# Patient Record
Sex: MALE | Race: BLACK OR AFRICAN AMERICAN | NOT HISPANIC OR LATINO | Employment: FULL TIME | URBAN - METROPOLITAN AREA
[De-identification: names, ages, dates, MRNs, and addresses within clinical notes are randomized per-mention and may not be internally consistent; named-entity substitution may affect disease eponyms.]

---

## 2017-05-30 ENCOUNTER — OFFICE VISIT (OUTPATIENT)
Dept: INTERNAL MEDICINE | Facility: CLINIC | Age: 47
End: 2017-05-30
Payer: COMMERCIAL

## 2017-05-30 VITALS
SYSTOLIC BLOOD PRESSURE: 142 MMHG | WEIGHT: 225.5 LBS | HEIGHT: 76 IN | BODY MASS INDEX: 27.46 KG/M2 | TEMPERATURE: 98 F | HEART RATE: 73 BPM | OXYGEN SATURATION: 98 % | DIASTOLIC BLOOD PRESSURE: 92 MMHG

## 2017-05-30 DIAGNOSIS — T63.441A BEE STING, ACCIDENTAL OR UNINTENTIONAL, INITIAL ENCOUNTER: Primary | ICD-10-CM

## 2017-05-30 DIAGNOSIS — R03.0 ELEVATED BLOOD PRESSURE READING: ICD-10-CM

## 2017-05-30 PROCEDURE — 99214 OFFICE O/P EST MOD 30 MIN: CPT | Mod: PBBFAC | Performed by: INTERNAL MEDICINE

## 2017-05-30 PROCEDURE — 99213 OFFICE O/P EST LOW 20 MIN: CPT | Mod: S$GLB,,, | Performed by: INTERNAL MEDICINE

## 2017-05-30 PROCEDURE — 99999 PR PBB SHADOW E&M-EST. PATIENT-LVL IV: CPT | Mod: PBBFAC,,, | Performed by: INTERNAL MEDICINE

## 2017-05-30 RX ORDER — PREDNISONE 20 MG/1
40 TABLET ORAL DAILY
Qty: 10 TABLET | Refills: 0 | Status: SHIPPED | OUTPATIENT
Start: 2017-05-30 | End: 2017-06-04

## 2017-05-30 NOTE — PROGRESS NOTES
Subjective:       Patient ID: Goldy Irizarry is a 46 y.o. male.    Chief Complaint: Insect Bite    HPI    3 weeks ago when helping a friend with house projects he sustained a bee sting to the dorsum of the left hand.  There was some mild swelling and soreness, however symptoms resolved over a week.  Yesterday he sustained another staining on the forearm, witnessed the bee sting in, occurred around 3 PM.  He began develop swelling which woke him up at night in the arm, including around the left hand where he was previously stung.  No nausea or vomiting, no fever or chills, no diarrhea.  He was having some difficulty fully closing his hand this afternoon, but that has started to improve.    Review of Systems   Constitutional: Negative for activity change and unexpected weight change.   HENT: Negative for hearing loss, rhinorrhea and trouble swallowing.    Eyes: Negative for discharge and visual disturbance.   Respiratory: Negative for chest tightness and wheezing.    Cardiovascular: Negative for chest pain and palpitations.   Gastrointestinal: Negative for blood in stool, constipation, diarrhea and vomiting.   Endocrine: Negative for polydipsia and polyuria.   Genitourinary: Negative for difficulty urinating, hematuria and urgency.   Musculoskeletal: Positive for joint swelling. Negative for arthralgias and neck pain.   Neurological: Negative for weakness and headaches.   Psychiatric/Behavioral: Negative for confusion and dysphoric mood.       Objective:      Physical Exam   Constitutional: No distress.   -American man whose Body mass index is 27.45 kg/m².    Musculoskeletal: He exhibits edema (in dorsum of L hand, some also dorsum of L forearm). He exhibits no tenderness.   slightly diminished range of motion in L hand fingers secondary to swelling   Nursing note and vitals reviewed.      Vitals:    05/30/17 1808 05/30/17 1813 05/30/17 1900   BP: (!) 140/101 (!) 162/92 (!) 142/92   BP Location:   Right arm  "  Patient Position:   Sitting   BP Method:   Manual   Pulse: 68 73    Temp: 97.9 °F (36.6 °C)     TempSrc: Oral     SpO2: 98%     Weight: 102.3 kg (225 lb 8.5 oz)     Height: 6' 4" (1.93 m)       Body mass index is 27.45 kg/m².    I have personally checked the blood pressure and documented my findings.     Assessment:       1. Bee sting, accidental or unintentional, initial encounter    2. Elevated blood pressure reading        Plan:   Goldy was seen today for insect bite.    Diagnoses and all orders for this visit:    Bee sting, accidental or unintentional, initial encounter:  No signs of anaphylaxis. Tx as below:  "If the swelling in the arm starts to worsen, or if new swelling starts to develop other places, please fill the printed prescription for prednisone.  For relief of swelling, please start daily over-the-counter antihistamines like Claritin, Zyrtec, or Allegra once daily; the generic versions all work well, too.  Please also use over-the-counter Benadryl at night."  -     predniSONE (DELTASONE) 20 MG tablet; Take 2 tablets (40 mg total) by mouth once daily.    Elevated blood pressure reading:  Counseled to avoid salty foods. Monitor at home. If persistently elevated please notify the office.    Keep regular follow up appointments with Yi Rojas MD.   Jonh Cote MD  Internal Medicine    Portions of this note were completed using [employer] dictation software. Please excuse typographical or syntax errors.   "

## 2017-05-31 NOTE — PATIENT INSTRUCTIONS
High salt foods will affect blood pressure. Processed foods (including pastas and breads) often contain a high amount of salt, canned foods as well.    Blood Pressure Measurement:    -- Please record your blood pressure 1-2 times per week. When checking, make sure you have been sitting for about 5 minutes, your legs are uncrossed, and the blood pressure cuff at the level of your heart. Record your blood pressure with the date and time in a log and bring it with you to every doctor's visit.  -- Goal blood pressure is top number less than 140 and bottom number less than 90. If your blood pressure is higher than this on two consecutive occasions, contact the office.    If the swelling in the arm starts to worsen, or if new swelling starts to develop other places, please fill the printed prescription for prednisone.    For relief of swelling, please start daily over-the-counter antihistamines like Claritin, Zyrtec, or Allegra once daily; the generic versions all work well, too.  Please also use over-the-counter Benadryl at night.

## 2017-11-02 ENCOUNTER — TELEPHONE (OUTPATIENT)
Dept: DERMATOLOGY | Facility: CLINIC | Age: 47
End: 2017-11-02

## 2017-11-02 NOTE — TELEPHONE ENCOUNTER
----- Message from Rachel Rivero sent at 11/1/2017  4:55 PM CDT -----  Contact: self  Pt called and scheduled an appt on 11/08/17. However, pt would like to know if you have any afternoon cancellations for this week, Mon(11/6) or Tues(11/7).     Pt can be reached at [phone].    Thank you

## 2017-11-02 NOTE — TELEPHONE ENCOUNTER
Spoke with pt regarding appointment. Pt informed that there are no later appointments available for the days he asked for. Appt remains for 11/8/17 @ 3 p.m..         ----- Message from Tatyana Skelton sent at 11/2/2017 11:32 AM CDT -----  Contact: pt   Jose-pt- pt is returning Santa's call can you please call pt at [phone]    SUSHIL

## 2017-11-08 ENCOUNTER — OFFICE VISIT (OUTPATIENT)
Dept: DERMATOLOGY | Facility: CLINIC | Age: 47
End: 2017-11-08
Payer: COMMERCIAL

## 2017-11-08 DIAGNOSIS — L91.8 SKIN TAG: Primary | ICD-10-CM

## 2017-11-08 DIAGNOSIS — R20.9 SKIN SENSATION DISTURBANCE: ICD-10-CM

## 2017-11-08 PROCEDURE — 99499 UNLISTED E&M SERVICE: CPT | Mod: S$GLB,,, | Performed by: NURSE PRACTITIONER

## 2017-11-08 PROCEDURE — 11200 RMVL SKIN TAGS UP TO&INC 15: CPT | Mod: S$GLB,,, | Performed by: NURSE PRACTITIONER

## 2017-11-08 PROCEDURE — 99999 PR PBB SHADOW E&M-EST. PATIENT-LVL II: CPT | Mod: PBBFAC,,, | Performed by: NURSE PRACTITIONER

## 2017-11-08 NOTE — PATIENT INSTRUCTIONS

## 2017-11-08 NOTE — PROGRESS NOTES
Subjective:       Patient ID:  Goldy Irizarry is a 47 y.o. male who presents for   Chief Complaint   Patient presents with    Skin Tags     right groin x 2 years, irritated raise no prev tx, x 3 weeks, swelled up a little part of it came off/discharge      Skin Tags  - Initial  Affected locations: groin (right)  Duration: 2 years (Irritated with in the past 2-3 weeks)  Signs / symptoms: irritated  Severity: mild  Timing: constant  Aggravated by: friction  Relieving factors/Treatments tried: nothing  Improvement on treatment: no relief        Review of Systems   Constitutional: Negative for fever and chills.   Skin: Positive for activity-related sunscreen use. Negative for daily sunscreen use.   Hematologic/Lymphatic: Does not bruise/bleed easily.        Objective:    Physical Exam   Constitutional: He appears well-developed and well-nourished. No distress.   Neurological: He is alert and oriented to person, place, and time. He is not disoriented.   Psychiatric: He has a normal mood and affect.   Skin:   Areas Examined (abnormalities noted in diagram):   Neck Inspection Performed  Genitals / Buttocks / Groin Inspection Performed              Diagram Legend     Erythematous scaling macule/papule c/w actinic keratosis       Vascular papule c/w angioma      Pigmented verrucoid papule/plaque c/w seborrheic keratosis      Yellow umbilicated papule c/w sebaceous hyperplasia      Irregularly shaped tan macule c/w lentigo     1-2 mm smooth white papules consistent with Milia      Movable subcutaneous cyst with punctum c/w epidermal inclusion cyst      Subcutaneous movable cyst c/w pilar cyst      Firm pink to brown papule c/w dermatofibroma      Pedunculated fleshy papule(s) c/w skin tag(s)      Evenly pigmented macule c/w junctional nevus     Mildly variegated pigmented, slightly irregular-bordered macule c/w mildly atypical nevus      Flesh colored to evenly pigmented papule c/w intradermal nevus       Pink pearly  papule/plaque c/w basal cell carcinoma      Erythematous hyperkeratotic cursted plaque c/w SCC      Surgical scar with no sign of skin cancer recurrence      Open and closed comedones      Inflammatory papules and pustules      Verrucoid papule consistent consistent with wart     Erythematous eczematous patches and plaques     Dystrophic onycholytic nail with subungual debris c/w onychomycosis     Umbilicated papule    Erythematous-base heme-crusted tan verrucoid plaque consistent with inflamed seborrheic keratosis     Erythematous Silvery Scaling Plaque c/w Psoriasis     See annotation      Assessment / Plan:        Skin tag- inflamed, with  Skin sensation disturbance  Verbal consent obtained. 1 lesions- right groin removed with scissor snip removal after anesthesia with 1% lidocaine with epinephrine. Hemostasis achieved with aluminum chloride and hyfrecation. No complications.             Return if symptoms worsen or fail to improve.

## 2017-12-07 ENCOUNTER — TELEPHONE (OUTPATIENT)
Dept: NEUROSURGERY | Facility: CLINIC | Age: 47
End: 2017-12-07

## 2017-12-07 ENCOUNTER — TELEPHONE (OUTPATIENT)
Dept: ORTHOPEDICS | Facility: CLINIC | Age: 47
End: 2017-12-07

## 2017-12-07 DIAGNOSIS — M54.2 ACUTE NECK PAIN: Primary | ICD-10-CM

## 2017-12-07 DIAGNOSIS — M25.512 LEFT SHOULDER PAIN, UNSPECIFIED CHRONICITY: Primary | ICD-10-CM

## 2017-12-07 NOTE — TELEPHONE ENCOUNTER
Ortho Telephone Triage Message  [phone]  Patient C/O: L shoulder/arm pain and numbness. Pt indicates that pain started in his neck about a month ago. Pt denies known injury. Pt requests same day Ortho appt and declines being seen in Urgent Care/ED.   Triage Advise: Advised pt that - if he does not want to be seen by PCP/ Urgent Care/ED - he may be seen, if appt available, only for L shoulder or neck complaint today. One or other will need to be scheduled as a separate appt at a later date.   Resolution: Pt states understanding and would like appt scheduled for either complaint, if available today. Advised pt that he will receive a follow up call with appt today for c/o  L shoulder or neck pain. Pt states understanding and will await call. ROBERT Mclaughlin-CHARISSA/[facility] notified and appt to be scheduled this afternoon for c/o L shoulder pain.

## 2017-12-07 NOTE — TELEPHONE ENCOUNTER
Ortho Telephone Triage Follow Up Call [phone]  Spoke with pt who declines appt scheduled today with COLLEEN Garcia PA-C/[facility] for c/o L shoulder/arm pain/numbness. Appt cancelled.  Notified pt that first available appt for c/o neck pain has been scheduled with ROBERT Robb/[facility] on 12/13/17 at 9:00am with xrays to be scheduled prior to appt. Advised pt that ESTEFANIA Puckett MA/[facility] will be contacting him to schedule xrays. Pt states understanding. Pt to go to Urgent Care or ED for worsening pain or other concerns, in interim. [facility] contact number provided.

## 2017-12-07 NOTE — TELEPHONE ENCOUNTER
----- Message from Pat Foss sent at 12/7/2017 10:18 AM CST -----  Contact: Self  Pt is calling to be seen for neck/back/arm/shoulder pain today, hopefully.  Pt says he is in extreme pain and has been the last month.  He is requesting a returned call.    He can be reached at [phone].    Thank you.

## 2021-06-14 ENCOUNTER — IMMUNIZATION (OUTPATIENT)
Dept: INTERNAL MEDICINE | Facility: CLINIC | Age: 51
End: 2021-06-14
Payer: COMMERCIAL

## 2021-06-14 DIAGNOSIS — Z23 NEED FOR VACCINATION: Primary | ICD-10-CM

## 2021-06-14 PROCEDURE — 91300 COVID-19, MRNA, LNP-S, PF, 30 MCG/0.3 ML DOSE VACCINE: CPT | Mod: PBBFAC | Performed by: INTERNAL MEDICINE

## 2021-07-08 ENCOUNTER — IMMUNIZATION (OUTPATIENT)
Dept: INTERNAL MEDICINE | Facility: CLINIC | Age: 51
End: 2021-07-08
Payer: COMMERCIAL

## 2021-07-08 DIAGNOSIS — Z23 NEED FOR VACCINATION: Primary | ICD-10-CM

## 2021-07-08 PROCEDURE — 0002A COVID-19, MRNA, LNP-S, PF, 30 MCG/0.3 ML DOSE VACCINE: ICD-10-PCS | Mod: CV19,S$GLB,, | Performed by: INTERNAL MEDICINE

## 2021-07-08 PROCEDURE — 0002A COVID-19, MRNA, LNP-S, PF, 30 MCG/0.3 ML DOSE VACCINE: CPT | Mod: CV19,S$GLB,, | Performed by: INTERNAL MEDICINE

## 2021-07-08 PROCEDURE — 91300 COVID-19, MRNA, LNP-S, PF, 30 MCG/0.3 ML DOSE VACCINE: CPT | Mod: S$GLB,,, | Performed by: INTERNAL MEDICINE

## 2021-07-08 PROCEDURE — 91300 COVID-19, MRNA, LNP-S, PF, 30 MCG/0.3 ML DOSE VACCINE: ICD-10-PCS | Mod: S$GLB,,, | Performed by: INTERNAL MEDICINE

## 2023-12-07 ENCOUNTER — IMMUNIZATION (OUTPATIENT)
Dept: INTERNAL MEDICINE | Facility: CLINIC | Age: 53
End: 2023-12-07
Payer: COMMERCIAL

## 2023-12-07 ENCOUNTER — OFFICE VISIT (OUTPATIENT)
Dept: INTERNAL MEDICINE | Facility: CLINIC | Age: 53
End: 2023-12-07
Payer: COMMERCIAL

## 2023-12-07 VITALS
BODY MASS INDEX: 25.64 KG/M2 | DIASTOLIC BLOOD PRESSURE: 80 MMHG | SYSTOLIC BLOOD PRESSURE: 138 MMHG | OXYGEN SATURATION: 97 % | HEIGHT: 76 IN | HEART RATE: 88 BPM | WEIGHT: 210.56 LBS

## 2023-12-07 DIAGNOSIS — R22.0 RIGHT FACIAL SWELLING: Primary | ICD-10-CM

## 2023-12-07 DIAGNOSIS — Z23 NEED FOR VACCINATION: Primary | ICD-10-CM

## 2023-12-07 PROCEDURE — 3079F DIAST BP 80-89 MM HG: CPT | Mod: CPTII,S$GLB,, | Performed by: INTERNAL MEDICINE

## 2023-12-07 PROCEDURE — 3079F PR MOST RECENT DIASTOLIC BLOOD PRESSURE 80-89 MM HG: ICD-10-PCS | Mod: CPTII,S$GLB,, | Performed by: INTERNAL MEDICINE

## 2023-12-07 PROCEDURE — 99214 OFFICE O/P EST MOD 30 MIN: CPT | Mod: 25,S$GLB,, | Performed by: INTERNAL MEDICINE

## 2023-12-07 PROCEDURE — 1159F MED LIST DOCD IN RCRD: CPT | Mod: CPTII,S$GLB,, | Performed by: INTERNAL MEDICINE

## 2023-12-07 PROCEDURE — 1159F PR MEDICATION LIST DOCUMENTED IN MEDICAL RECORD: ICD-10-PCS | Mod: CPTII,S$GLB,, | Performed by: INTERNAL MEDICINE

## 2023-12-07 PROCEDURE — 99999 PR PBB SHADOW E&M-EST. PATIENT-LVL III: ICD-10-PCS | Mod: PBBFAC,,, | Performed by: INTERNAL MEDICINE

## 2023-12-07 PROCEDURE — 99214 PR OFFICE/OUTPT VISIT, EST, LEVL IV, 30-39 MIN: ICD-10-PCS | Mod: 25,S$GLB,, | Performed by: INTERNAL MEDICINE

## 2023-12-07 PROCEDURE — 3075F SYST BP GE 130 - 139MM HG: CPT | Mod: CPTII,S$GLB,, | Performed by: INTERNAL MEDICINE

## 2023-12-07 PROCEDURE — 90471 FLU VACCINE (QUAD) GREATER THAN OR EQUAL TO 3YO PRESERVATIVE FREE IM: ICD-10-PCS | Mod: S$GLB,,, | Performed by: INTERNAL MEDICINE

## 2023-12-07 PROCEDURE — 90471 IMMUNIZATION ADMIN: CPT | Mod: S$GLB,,, | Performed by: INTERNAL MEDICINE

## 2023-12-07 PROCEDURE — 90686 IIV4 VACC NO PRSV 0.5 ML IM: CPT | Mod: S$GLB,,, | Performed by: INTERNAL MEDICINE

## 2023-12-07 PROCEDURE — 90686 FLU VACCINE (QUAD) GREATER THAN OR EQUAL TO 3YO PRESERVATIVE FREE IM: ICD-10-PCS | Mod: S$GLB,,, | Performed by: INTERNAL MEDICINE

## 2023-12-07 PROCEDURE — 3008F PR BODY MASS INDEX (BMI) DOCUMENTED: ICD-10-PCS | Mod: CPTII,S$GLB,, | Performed by: INTERNAL MEDICINE

## 2023-12-07 PROCEDURE — 3075F PR MOST RECENT SYSTOLIC BLOOD PRESS GE 130-139MM HG: ICD-10-PCS | Mod: CPTII,S$GLB,, | Performed by: INTERNAL MEDICINE

## 2023-12-07 PROCEDURE — 99999 PR PBB SHADOW E&M-EST. PATIENT-LVL III: CPT | Mod: PBBFAC,,, | Performed by: INTERNAL MEDICINE

## 2023-12-07 PROCEDURE — 3008F BODY MASS INDEX DOCD: CPT | Mod: CPTII,S$GLB,, | Performed by: INTERNAL MEDICINE

## 2023-12-07 RX ORDER — AMOXICILLIN AND CLAVULANATE POTASSIUM 875; 125 MG/1; MG/1
1 TABLET, FILM COATED ORAL EVERY 12 HOURS
Qty: 20 TABLET | Refills: 0 | Status: SHIPPED | OUTPATIENT
Start: 2023-12-07 | End: 2023-12-17

## 2023-12-07 NOTE — PROGRESS NOTES
INTERNAL MEDICINE CLINIC - SAME DAY APPOINTMENT  Progress Note    PRESENTING HISTORY     PCP: Sampson Millan MD    Chief Complaint/Reason for Visit:     Chief Complaint   Patient presents with    Facial Swelling    Cough      History of Present Illness & ROS : Mr. Goldy Irizarry is a 53 y.o. male.      He noted swelling on his right cheek bone for two months.  No pain. No redness.     Had dental cleaning on upper wisdom teeth.  He did mention to his dentist. No abx was given.    No fever or chill.     PAST HISTORY:     History reviewed. No pertinent past medical history.    History reviewed. No pertinent surgical history.    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    Single    One girl 27 (as of 2023).       MEDICATIONS & ALLERGIES:     No current outpatient medications on file prior to visit.     No current facility-administered medications on file prior to visit.        Review of patient's allergies indicates:  No Known Allergies    Medications Reconciliation:   I have reconciled the patient's home medications with the patient/family. I have updated all changes.  Refer to After-Visit Medication List.    OBJECTIVE:     Vital Signs:  Vitals:    12/07/23 1704   BP: 138/80   Pulse: 88     Wt Readings from Last 3 Encounters:   12/07/23 1704 95.5 kg (210 lb 8.6 oz)   05/30/17 1808 102.3 kg (225 lb 8.5 oz)   11/11/15 1518 103.9  kg (229 lb 0.9 oz)     Body mass index is 25.63 kg/m².     Physical Exam:  General: Well developed, well nourished. No distress.  HEENT: Head is normocephalic, atraumatic; ears are normal.    Eyes: Clear conjunctiva.  Neck: Supple, symmetrical neck; trachea midline.  Lungs: Clear to auscultation bilaterally and normal respiratory effort.  Cardiovascular: Heart with regular rate and rhythm.    Extremities: No LE edema.   Skin: Skin color, texture, turgor normal. No rashes.  Musculoskeletal: Normal gait.   Lymph Nodes: No cervical or supraclavicular adenopathy.  Psychiatric: Normal affect. Alert.    Laboratory  Lab Results   Component Value Date    WBC 4.73 11/11/2015    HGB 14.7 11/11/2015    HCT 44.6 11/11/2015     11/11/2015    CHOL 138 11/11/2015    TRIG 59 11/11/2015    HDL 43 11/11/2015    ALT 24 11/11/2015    AST 31 11/11/2015     11/11/2015    K 3.7 11/11/2015     11/11/2015    CREATININE 1.3 11/11/2015    BUN 10 11/11/2015    CO2 25 11/11/2015    TSH 1.782 11/11/2015    PSA 1.2 11/11/2015    HGBA1C 4.8 11/11/2015       ASSESSMENT & PLAN:     Right facial swelling  - Right cheek area.    Had gum issues to the right upper molar.    Plan:  Trial of  -     amoxicillin-clavulanate 875-125mg (AUGMENTIN) 875-125 mg per tablet; Take 1 tablet by mouth every 12 (twelve) hours. for 10 days  Dispense: 20 tablet; Refill: 0    RTC 1/10/24 to establish care per patient's request.    Flu vaccine today.    Scheduled Follow-up :  Future Appointments   Date Time Provider Department Center   1/10/2024 11:00 AM Sampson Millan MD [facility]       After Visit Medication List :     Medication List            Accurate as of December 7, 2023  5:30 PM. If you have any questions, ask your nurse or doctor.                START taking these medications      amoxicillin-clavulanate 875-125mg 875-125 mg per tablet  Commonly known as: AUGMENTIN  Take 1 tablet by mouth every 12 (twelve) hours. for 10 days  Started  by: Sampson Millan MD               Where to Get Your Medications        These medications were sent to [id number] - [address] AT [facility] [address] [address]      Phone: [phone]   amoxicillin-clavulanate 875-125mg 875-125 mg per tablet         Signing Physician:  Sampson Millan MD

## 2024-01-10 ENCOUNTER — LAB VISIT (OUTPATIENT)
Dept: LAB | Facility: HOSPITAL | Age: 54
End: 2024-01-10
Attending: INTERNAL MEDICINE
Payer: COMMERCIAL

## 2024-01-10 ENCOUNTER — OFFICE VISIT (OUTPATIENT)
Dept: INTERNAL MEDICINE | Facility: CLINIC | Age: 54
End: 2024-01-10
Payer: COMMERCIAL

## 2024-01-10 VITALS
DIASTOLIC BLOOD PRESSURE: 82 MMHG | SYSTOLIC BLOOD PRESSURE: 122 MMHG | HEIGHT: 76 IN | HEART RATE: 81 BPM | BODY MASS INDEX: 25.56 KG/M2 | WEIGHT: 209.88 LBS | TEMPERATURE: 98 F | OXYGEN SATURATION: 97 %

## 2024-01-10 DIAGNOSIS — Z12.5 SCREENING FOR MALIGNANT NEOPLASM OF PROSTATE: ICD-10-CM

## 2024-01-10 DIAGNOSIS — Z23 NEED FOR TDAP VACCINATION: ICD-10-CM

## 2024-01-10 DIAGNOSIS — Z00.00 ANNUAL PHYSICAL EXAM: Primary | ICD-10-CM

## 2024-01-10 DIAGNOSIS — Z23 NEED FOR SHINGLES VACCINE: ICD-10-CM

## 2024-01-10 DIAGNOSIS — Z12.11 SCREENING FOR MALIGNANT NEOPLASM OF COLON: ICD-10-CM

## 2024-01-10 DIAGNOSIS — Z00.00 ANNUAL PHYSICAL EXAM: ICD-10-CM

## 2024-01-10 DIAGNOSIS — E55.9 VITAMIN D INSUFFICIENCY: ICD-10-CM

## 2024-01-10 LAB
ALBUMIN SERPL BCP-MCNC: 3.8 G/DL (ref 3.5–5.2)
ALP SERPL-CCNC: 68 U/L (ref 55–135)
ALT SERPL W/O P-5'-P-CCNC: 15 U/L (ref 10–44)
ANION GAP SERPL CALC-SCNC: 10 MMOL/L (ref 8–16)
AST SERPL-CCNC: 20 U/L (ref 10–40)
BASOPHILS # BLD AUTO: 0.05 K/UL (ref 0–0.2)
BASOPHILS NFR BLD: 1.2 % (ref 0–1.9)
BILIRUB SERPL-MCNC: 1.1 MG/DL (ref 0.1–1)
BUN SERPL-MCNC: 19 MG/DL (ref 6–20)
CALCIUM SERPL-MCNC: 9.4 MG/DL (ref 8.7–10.5)
CHLORIDE SERPL-SCNC: 102 MMOL/L (ref 95–110)
CHOLEST SERPL-MCNC: 156 MG/DL (ref 120–199)
CHOLEST/HDLC SERPL: 3.1 {RATIO} (ref 2–5)
CO2 SERPL-SCNC: 25 MMOL/L (ref 23–29)
COMPLEXED PSA SERPL-MCNC: 3.8 NG/ML (ref 0–4)
CREAT SERPL-MCNC: 1.2 MG/DL (ref 0.5–1.4)
CRP SERPL-MCNC: 1.2 MG/L (ref 0–8.2)
DIFFERENTIAL METHOD BLD: ABNORMAL
EOSINOPHIL # BLD AUTO: 0.1 K/UL (ref 0–0.5)
EOSINOPHIL NFR BLD: 2.4 % (ref 0–8)
ERYTHROCYTE [DISTWIDTH] IN BLOOD BY AUTOMATED COUNT: 13 % (ref 11.5–14.5)
ERYTHROCYTE [SEDIMENTATION RATE] IN BLOOD BY PHOTOMETRIC METHOD: 23 MM/HR (ref 0–23)
EST. GFR  (NO RACE VARIABLE): >60 ML/MIN/1.73 M^2
ESTIMATED AVG GLUCOSE: 91 MG/DL (ref 68–131)
GLUCOSE SERPL-MCNC: 87 MG/DL (ref 70–110)
HBA1C MFR BLD: 4.8 % (ref 4–5.6)
HCT VFR BLD AUTO: 45.5 % (ref 40–54)
HCV AB SERPL QL IA: NORMAL
HDLC SERPL-MCNC: 50 MG/DL (ref 40–75)
HDLC SERPL: 32.1 % (ref 20–50)
HGB BLD-MCNC: 14.4 G/DL (ref 14–18)
HIV 1+2 AB+HIV1 P24 AG SERPL QL IA: NORMAL
IMM GRANULOCYTES # BLD AUTO: 0.01 K/UL (ref 0–0.04)
IMM GRANULOCYTES NFR BLD AUTO: 0.2 % (ref 0–0.5)
LDLC SERPL CALC-MCNC: 95.6 MG/DL (ref 63–159)
LYMPHOCYTES # BLD AUTO: 1.6 K/UL (ref 1–4.8)
LYMPHOCYTES NFR BLD: 38.2 % (ref 18–48)
MCH RBC QN AUTO: 28.3 PG (ref 27–31)
MCHC RBC AUTO-ENTMCNC: 31.6 G/DL (ref 32–36)
MCV RBC AUTO: 89 FL (ref 82–98)
MONOCYTES # BLD AUTO: 0.4 K/UL (ref 0.3–1)
MONOCYTES NFR BLD: 10.1 % (ref 4–15)
NEUTROPHILS # BLD AUTO: 2 K/UL (ref 1.8–7.7)
NEUTROPHILS NFR BLD: 47.9 % (ref 38–73)
NONHDLC SERPL-MCNC: 106 MG/DL
NRBC BLD-RTO: 0 /100 WBC
PLATELET # BLD AUTO: 319 K/UL (ref 150–450)
PMV BLD AUTO: 10.3 FL (ref 9.2–12.9)
POTASSIUM SERPL-SCNC: 4.3 MMOL/L (ref 3.5–5.1)
PROCALCITONIN SERPL IA-MCNC: 0.02 NG/ML
PROT SERPL-MCNC: 8 G/DL (ref 6–8.4)
RBC # BLD AUTO: 5.09 M/UL (ref 4.6–6.2)
SODIUM SERPL-SCNC: 137 MMOL/L (ref 136–145)
TRIGL SERPL-MCNC: 52 MG/DL (ref 30–150)
TSH SERPL DL<=0.005 MIU/L-ACNC: 1.85 UIU/ML (ref 0.4–4)
WBC # BLD AUTO: 4.14 K/UL (ref 3.9–12.7)

## 2024-01-10 PROCEDURE — 87389 HIV-1 AG W/HIV-1&-2 AB AG IA: CPT | Performed by: INTERNAL MEDICINE

## 2024-01-10 PROCEDURE — 85652 RBC SED RATE AUTOMATED: CPT | Performed by: INTERNAL MEDICINE

## 2024-01-10 PROCEDURE — 3008F BODY MASS INDEX DOCD: CPT | Mod: CPTII,S$GLB,, | Performed by: INTERNAL MEDICINE

## 2024-01-10 PROCEDURE — 84443 ASSAY THYROID STIM HORMONE: CPT | Performed by: INTERNAL MEDICINE

## 2024-01-10 PROCEDURE — 84145 PROCALCITONIN (PCT): CPT | Performed by: INTERNAL MEDICINE

## 2024-01-10 PROCEDURE — 83036 HEMOGLOBIN GLYCOSYLATED A1C: CPT | Performed by: INTERNAL MEDICINE

## 2024-01-10 PROCEDURE — 86803 HEPATITIS C AB TEST: CPT | Performed by: INTERNAL MEDICINE

## 2024-01-10 PROCEDURE — 86140 C-REACTIVE PROTEIN: CPT | Performed by: INTERNAL MEDICINE

## 2024-01-10 PROCEDURE — 84153 ASSAY OF PSA TOTAL: CPT | Performed by: INTERNAL MEDICINE

## 2024-01-10 PROCEDURE — 36415 COLL VENOUS BLD VENIPUNCTURE: CPT | Performed by: INTERNAL MEDICINE

## 2024-01-10 PROCEDURE — 1159F MED LIST DOCD IN RCRD: CPT | Mod: CPTII,S$GLB,, | Performed by: INTERNAL MEDICINE

## 2024-01-10 PROCEDURE — 99999 PR PBB SHADOW E&M-EST. PATIENT-LVL IV: CPT | Mod: PBBFAC,,, | Performed by: INTERNAL MEDICINE

## 2024-01-10 PROCEDURE — 99396 PREV VISIT EST AGE 40-64: CPT | Mod: 25,S$GLB,, | Performed by: INTERNAL MEDICINE

## 2024-01-10 PROCEDURE — 80053 COMPREHEN METABOLIC PANEL: CPT | Performed by: INTERNAL MEDICINE

## 2024-01-10 PROCEDURE — 90715 TDAP VACCINE 7 YRS/> IM: CPT | Mod: S$GLB,,, | Performed by: INTERNAL MEDICINE

## 2024-01-10 PROCEDURE — 3079F DIAST BP 80-89 MM HG: CPT | Mod: CPTII,S$GLB,, | Performed by: INTERNAL MEDICINE

## 2024-01-10 PROCEDURE — 90471 IMMUNIZATION ADMIN: CPT | Mod: S$GLB,,, | Performed by: INTERNAL MEDICINE

## 2024-01-10 PROCEDURE — 85025 COMPLETE CBC W/AUTO DIFF WBC: CPT | Performed by: INTERNAL MEDICINE

## 2024-01-10 PROCEDURE — 80061 LIPID PANEL: CPT | Performed by: INTERNAL MEDICINE

## 2024-01-10 PROCEDURE — 3074F SYST BP LT 130 MM HG: CPT | Mod: CPTII,S$GLB,, | Performed by: INTERNAL MEDICINE

## 2024-01-10 RX ORDER — ZOSTER VACCINE RECOMBINANT, ADJUVANTED 50 MCG/0.5
0.5 KIT INTRAMUSCULAR ONCE
Qty: 1 EACH | Refills: 0 | Status: SHIPPED | OUTPATIENT
Start: 2024-01-10 | End: 2024-01-11

## 2024-01-10 RX ORDER — ZOSTER VACCINE RECOMBINANT, ADJUVANTED 50 MCG/0.5
0.5 KIT INTRAMUSCULAR ONCE
Qty: 1 EACH | Refills: 0 | Status: SHIPPED | OUTPATIENT
Start: 2024-03-11 | End: 2024-03-11

## 2024-01-10 RX ORDER — ACETAMINOPHEN 500 MG
5000 TABLET ORAL DAILY
COMMUNITY
Start: 2024-01-10

## 2024-01-10 NOTE — PROGRESS NOTES
INTERNAL MEDICINE CLINIC  Follow-up Visit Progress Note     PRESENTING HISTORY     PCP: Sampson Millan MD    Last Clinic Visit with me:  12-7-2023    Current Chief Complaint/Problem:    Chief Complaint   Patient presents with    Annual Exam      History of Present Illness & ROS: Mr. Goldy Irizarry is a 53 y.o. male.    Review of Systems:  Review of Systems   Constitutional:  Negative for chills and fever.   Respiratory:  Negative for cough.    Cardiovascular:  Negative for chest pain.   Gastrointestinal:  Negative for blood in stool and constipation.   Genitourinary:  Negative for hematuria.   Musculoskeletal:  Negative for joint pain.   Neurological:  Negative for headaches.   Psychiatric/Behavioral:  Negative for depression.        PAST HISTORY:     Past Medical History:   Diagnosis Date    Vitamin D insufficiency 01/10/2024       History reviewed. No pertinent surgical history.    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    Single    One girl 27 (as of 2023).- In [place].  Sib-in law is Cardiology.       MEDICATIONS & ALLERGIES:     No current outpatient medications on file prior to visit.     No current facility-administered medications on file prior to visit.        Review of patient's allergies indicates:  No Known Allergies    Medications Reconciliation:   I have reconciled the patient's home medications and discharge  medications with the patient/family. I have updated all changes.  Refer to After-Visit Medication List.    OBJECTIVE:     Vital Signs:  Vitals:    01/10/24 1057   BP: 122/82   Pulse: 81   Temp: 97.6 °F (36.4 °C)     Wt Readings from Last 3 Encounters:   01/10/24 1057 95.2 kg (209 lb 14.1 oz)   12/07/23 1704 95.5 kg (210 lb 8.6 oz)   05/30/17 1808 102.3 kg (225 lb 8.5 oz)     Body mass index is 25.55 kg/m².     Physical Exam:  General: Well developed, well nourished. No distress.  HEENT: Head is normocephalic, atraumatic; ears are normal.    Eyes: Clear conjunctiva.  Neck: Supple, symmetrical neck; trachea midline.  Lungs: Clear to auscultation bilaterally and normal respiratory effort.  Cardiovascular: Heart with regular rate and rhythm.    Extremities: No LE edema.    Abdomen: Abdomen is soft, non-tender non-distended with normal bowel sounds.  Musculoskeletal: Normal gait.   Genital:  Normal penis.    No rash in the genital area.  Scrotum and epididymis normal. No inguinal hernia.  No inguinal nodes.   Rectal: No perianal lesions or rash. Digital exam: prostate 15 g, normal rectum.  Lymph Nodes: No cervical, supraclavicular or axillary adenopathy.  Psychiatric: Normal affect. Alert.      Laboratory  Lab Results   Component Value Date    WBC 4.73 11/11/2015    HGB 14.7 11/11/2015    HCT 44.6 11/11/2015     11/11/2015    CHOL 138 11/11/2015    TRIG 59 11/11/2015    HDL 43 11/11/2015    ALT 24 11/11/2015    AST 31 11/11/2015     11/11/2015    K 3.7 11/11/2015     11/11/2015    CREATININE 1.3 11/11/2015    BUN 10 11/11/2015    CO2 25 11/11/2015    TSH 1.782 11/11/2015    PSA 1.2 11/11/2015    HGBA1C 4.8 11/11/2015       ASSESSMENT & PLAN:     Annual physical exam  - Reviewed and updated past and current medical problems.  Discussed treatment of current medical problems      - He still has concerns about upper upper cheek swelling though normal exam.       Will check for inflammatory markers.    -      Lipid Panel; Future; Expected date: 01/10/2024  -     CBC Auto Differential; Future; Expected date: 01/10/2024  -     Comprehensive Metabolic Panel; Future; Expected date: 01/10/2024  -     TSH; Future; Expected date: 01/10/2024  -     Hemoglobin A1C; Future; Expected date: 01/10/2024  -     PSA, Screening; Future; Expected date: 01/10/2024  -     HIV 1/2 Ag/Ab (4th Gen); Future; Expected date: 01/10/2024  -     Hepatitis C Antibody; Future; Expected date: 01/10/2024  -     Sedimentation rate; Future; Expected date: 01/10/2024  -     C-REACTIVE PROTEIN; Future; Expected date: 01/10/2024  -     Procalcitonin; Future; Expected date: 01/10/2024    Vitamin D insufficiency  -     cholecalciferol, vitamin D3, 125 mcg (5,000 unit) Tab; Take 1 tablet (5,000 Units total) by mouth once daily.    Screening for malignant neoplasm of colon  -     Ambulatory referral/consult to Endo Procedure ; Future; Expected date: 01/11/2024    Preventive Health Maintenance:    Need for Tdap vaccination  -     (In Office Administered) Tdap Vaccine    Need for shingles vaccine (Primary Care Pharmacy)  -     varicella-zoster gE-AS01B, PF, (SHINGRIX, PF,) 50 mcg/0.5 mL injection; Inject 0.5 mLs into the muscle once. for 1 dose  Dispense: 1 each; Refill: 0  -     varicella-zoster gE-AS01B, PF, (SHINGRIX, PF,) 50 mcg/0.5 mL injection; Inject 0.5 mLs into the muscle once. for 1 dose  Dispense: 1 each; Refill: 0    Return to Clinic for Follow Up with me:   1 year.    Scheduled Follow-up :  Future Appointments   Date Time Provider Department Center   1/10/2024 11:30 AM LAB, APPOINTMENT [facility]         After Visit Medication List :     Medication List            Accurate as of January 10, 2024 11:21 AM. If you have any questions, ask your nurse or doctor.                START taking these medications      cholecalciferol (vitamin D3) 125 mcg (5,000 unit) Tab  Take 1 tablet (5,000 Units total) by mouth once  daily.  Started by: Sampson Millan MD     * SHINGRIX (PF) 50 mcg/0.5 mL injection  Generic drug: varicella-zoster gE-AS01B (PF)  Inject 0.5 mLs into the muscle once. for 1 dose  Started by: Sampson Millan MD     * SHINGRIX (PF) 50 mcg/0.5 mL injection  Generic drug: varicella-zoster gE-AS01B (PF)  Inject 0.5 mLs into the muscle once. for 1 dose  Start taking on: March 11, 2024  Started by: Sampson Millan MD           * This list has 2 medication(s) that are the same as other medications prescribed for you. Read the directions carefully, and ask your doctor or other care provider to review them with you.                   Where to Get Your Medications        These medications were sent to [facility]  [address] [place] [id number]      Hours: Mon-Fri, 8a-5:30p Phone: [phone]   SHINGRIX (PF) 50 mcg/0.5 mL injection  SHINGRIX (PF) 50 mcg/0.5 mL injection       You can get these medications from any pharmacy    You don't need a prescription for these medications  cholecalciferol (vitamin D3) 125 mcg (5,000 unit) Tab         Signing Physician:  Sampson Millan MD

## 2024-02-02 ENCOUNTER — CLINICAL SUPPORT (OUTPATIENT)
Dept: ENDOSCOPY | Facility: HOSPITAL | Age: 54
End: 2024-02-02
Attending: INTERNAL MEDICINE
Payer: COMMERCIAL

## 2024-02-02 DIAGNOSIS — Z12.11 SCREENING FOR MALIGNANT NEOPLASM OF COLON: ICD-10-CM

## 2024-02-09 ENCOUNTER — TELEPHONE (OUTPATIENT)
Dept: ENDOSCOPY | Facility: HOSPITAL | Age: 54
End: 2024-02-09
Payer: COMMERCIAL

## 2024-02-09 DIAGNOSIS — Z12.11 COLON CANCER SCREENING: Primary | ICD-10-CM

## 2024-02-09 RX ORDER — POLYETHYLENE GLYCOL 3350, SODIUM SULFATE, POTASSIUM CHLORIDE, MAGNESIUM SULFATE, AND SODIUM CHLORIDE FOR ORAL SOLUTION 178.7-7.3G
1 KIT ORAL DAILY
Qty: 2 EACH | Refills: 0 | Status: SHIPPED | OUTPATIENT
Start: 2024-02-09 | End: 2024-02-11

## 2024-05-21 ENCOUNTER — TELEPHONE (OUTPATIENT)
Dept: ENDOSCOPY | Facility: HOSPITAL | Age: 54
End: 2024-05-21
Payer: COMMERCIAL

## 2024-05-21 DIAGNOSIS — Z12.11 SCREENING FOR COLON CANCER: Primary | ICD-10-CM

## 2024-05-21 RX ORDER — POLYETHYLENE GLYCOL 3350, SODIUM SULFATE, POTASSIUM CHLORIDE, MAGNESIUM SULFATE, AND SODIUM CHLORIDE FOR ORAL SOLUTION 178.7-7.3G
1 KIT ORAL DAILY
Qty: 2 EACH | Refills: 0 | Status: SHIPPED | OUTPATIENT
Start: 2024-05-21 | End: 2024-05-23

## 2024-05-21 NOTE — TELEPHONE ENCOUNTER
Spoke to patient to reschedule procedure(s) Colonoscopy referred by Dr. Millan.        Physician to perform procedure(s) Dr. RYAN Membreno  Date of Procedure (s) 7/10/24  Arrival Time 9:00 AM  Time of Procedure(s) 10:00 AM   Location of Procedure(s) [address]  Type of Rx Prep sent to patient: Suflave  Instructions provided to patient via Postal Mail    Patient was informed on the following information and verbalized understanding. Screening questionnaire reviewed with patient and complete. If procedure requires anesthesia, a responsible adult needs to be present to accompany the patient home, patient cannot drive after receiving anesthesia. Appointment details are tentative, especially check-in time. Patient will receive a prep-op call 7 days prior to confirm check-in time for procedure. If applicable the patient should contact their pharmacy to verify Rx for procedure prep is ready for pick-up. Patient was advised to call the scheduling department at [phone] if pharmacy states no Rx is available. Patient was advised to call the endoscopy scheduling department if any questions or concerns arise.       Endoscopy Scheduling Department        Colonoscopy Procedure Prep Instructions    Date of procedure: 7/10/24 Arrive at: 9:00AM    Location of Department:   [facility] [address] [place] [address]  Take the [facility] Elevators to 4th Floor Endoscopy Lab      As soon as possible:   your prep from pharmacy and over the counter DULCOLAX LAXATIVE TABLETS          On the day before your procedure   What You CAN do:   You may have clear liquids ONLY-see below for list.     What You CANNOT do:   Do not EAT solid food, drink milk or anything   colored red.  Do not drink alcohol.  Do not take oral medications within 1 hour of starting   each dose of SUFLAVE.  No gum chewing or candy morning of procedure    Liquids That Are OK to Drink:   Water  Sports drinks (Gatorade,  Power-Aid)  Coffee or tea (no cream or nondairy creamer)  Clear juices without pulp (apple, white grape)  Gelatin desserts (no fruit or toppings)  Clear soda (sprite, coke, ginger ale)  Chicken broth (until 12 midnight the night before procedure)    Note:     (Please disregard the insert instructions from pharmacy).  SUFLAVE Bowel Prep Kit is indicated for cleansing of the colon as a preparation for colonoscopy in adults.   Be sure to tell your doctor about all the medicines you take, including prescription and non-prescription medicines, vitamins, and herbal supplements. SUFLAVE Bowel Prep Kit may affect how other medicines work.  Medication taken by mouth may not be absorbed properly when taken within 1 hour before the start of each dose of SUFLAVE Bowel Prep Kit.    It is not uncommon to experience some abdominal cramping, nausea and/or vomiting when taking the prep. If you have nausea and/or vomiting while taking the prep, stop drinking for 20 to 30 minutes then continue.      How to take prep:    SUFLAVE Bowel Prep Kit is a (2-day) prep.   Two (2) doses of SUFLAVE are required for a complete preparation. Each dose consists of 1 bottle and 1 flavoring packet. Mix as directed prior to use. You must drink water with each dose of SUFLAVE, and additional water after each dose.    DOSE 1--Day Before Colonoscopy 7/9/24     Drink at least 6 to 8 glasses of clear liquids from time you wake up until you begin your prep and then continue until bedtime to avoid dehydration.     12:00 pm (NOON) Take four (4) Dulcolax (Bisacodyl) tablets with at least 8 ounces or more of clear liquids.      Open ONE (1) flavor-enhancing packet and pour the contents into one (1) bottle. Add lukewarm water up to the fill line. Mix until all the powder has disappeared.    Refrigerate the container.     6:00 pm:    You must complete Steps 1 through 3 before going to bed as shown below:    Step 1-Drink ALL the liquid in the container within   one  (1) hour.   Step 2-You must drink another 16-ounces of clear liquids.   Step 3-Repeat mixing instructions for the 2nd dose and refrigerate for the morning of the procedure.      IMPORTANT: If you experience preparation-related symptoms (for example, nausea, bloating, or cramping), stop or slow the rate of drinking the additional water until your symptoms decrease.    DOSE 2--Day of the Colonoscopy 7/10/24 at 2-3 AM.    For this dose, repeat Steps 1 and 2 shown above.     You may continue drinking water/clear liquids until   4 hours before your colonoscopy or as directed by the scheduling nurse  6:00AM.      For information about your procedure, two (2) things to view prior to colonoscopy:  Please watch this informational video. It is important to watch this animated consent video prior to your arrival. If you haven't watched the video prior to arriving, you are required to watch it during admission which can causes delays.    Options for viewing:   Using a keyboard:  press and hold the control tab (Ctrl) and left mouse click to follow links.           Colonoscopy Instructional Video                                                                                   OR    Type link address into your web browser's address bar:  [email or web address]      Educational Booklet with pictures:    Colonoscopy Prep - Liquid        IMPORTANT INFORMATION TO KNOW BEFORE YOUR PROCEDURE    [facility] [place] 4th Floor    If your procedure requires the administration of anesthesia, it is necessary for a responsible adult to drive you home. (Medical Transportation, Uber, Lyft, Taxi, etc. may ONLY be used if a responsible adult is present to accompany you home.  The responsible adult CAN'T be the  of the service).      person must be available to return to pick you up within 15 minutes of being notified of discharge.       Please bring a picture ID, insurance card, &  copayment      Take Medications as directed below:      If you begin taking any blood thinning medications or injectable weight loss/diabetes medications (other than insulin) , please contact the endoscopy scheduling department listed below as soon as possible.    If you are diabetic see the attached instruction sheet regarding your medication.     If you take HEART, BLOOD PRESSURE, SEIZURE, PAIN, LUNG (including inhalers/nebulizers), ANTI-REJECTION (transplant patients), or PSYCHIATRIC medications, please take at your regular times with a sip of water or as directed by the scheduling nurse.     Important contact information:    Endoscopy [phone] Hours of operation Monday-Friday 8:00-4:30pm.    Questions about insurance or financial obligations call [phone] or [phone].    If you have questions regarding the prep or need to reschedule, please call [phone]. After hours questions requiring immediate assistance, contact [facility] nurse line at [phone] or [phone].   NOTE:     On occasion, unforeseen circumstances may cause a delay in your procedure start time. We respect your time and appreciate your patience during these circumstances.      Comments: n/a    Are you ready for your Colonoscopy?      __ If you take blood thinners,weight loss or diabetic injectable medications, have you stopped taking them according to your doctor's instructions before your procedures?  __ Have you stopped eating solid foods and followed a clear liquid diet a full day before your procedure or followed the diet       guidelines indicated in your instructions?       REMINDER: NO BROTH AFTER MIDNIGHT THE DAY BEFORE PROCEDURE.   __ Have you completed all your prep solution? PLEASE DO NOT FOLLOW the insert/or box instructions from pharmacy)  __ Have you taken your blood pressure, heart, seizure, or other essential medications the morning of your procedure?  __ Have you planned for a ride  to and from procedure?  (Medical Transportation, Uber, Lyft, Taxi, etc. may ONLY be used if a responsible adult is present to accompany you home). The responsible adult CANNOT be the  of the service.  person must be available to return and pick you up within 15 minutes of being notified of discharge.           Questions or Concerns?  Please call us!  [phone] (M-F) [phone] (Nights and weekends)    Listed below are some helpful tips:    Please bring protective cases for eyewear and hearing aids-wear comfortable clothing/shoes.  Follow prep instructions closely so you don't have to do it twice.  Bowel prep is prescription used to clean out the colon before a colonoscopy. The prep increases movement of your colon by causing you to have diarrhea (loose stools). Cleaning out stool from the colon helps your doctor to see in your colon clearly during this procedure.   It is important to stay hydrated before, during and after bowel prep to prevent loss of fluid (dehydration). You can have water and your choice of clear liquids. Reminder: these liquids you will drink in addition to the bowel prep.     Preparing the mixture:    First, mix the prep with water.  Make the taste better by adding a sugar free drink mix (Crystal Light) can improve the taste of your prep.   Use a large bore (opening) straw. Place towards the back of mouth (throat) as tolerated.  Prepare a prep mixture that is lightly chilled, but not ice-cold. Drinking a large amount of ice-cold liquid can make you feel very ill.  Avoid drinking any RED beverages or eating popsicles with this color for the 24 hours leading up to your procedure. This color can look like blood in the colon.    Consuming the prep:    Take your time. If you feel ill, take a 15-minute break from drinking the prep mixture  Combat hunger and dehydration with clear liquids. Options like JELL-O, or popsicles will help.  Settle in with good reading material. The goal is  to clean out 6 feet of colon, so you can plan on spending a good deal of time in the bathroom. Have some good reading material on-hand or an iPad ready to keep yourself entertained!  Keep yourself comfortable. We recommend applying personal hygiene wipes, Tucks pads and a soothing ointment, like A&D ointment, Desitin, or Vaseline to your bottom before starting and as needed to protect your skin.

## 2024-07-02 ENCOUNTER — TELEPHONE (OUTPATIENT)
Dept: ENDOSCOPY | Facility: HOSPITAL | Age: 54
End: 2024-07-02
Payer: COMMERCIAL

## 2024-07-02 NOTE — TELEPHONE ENCOUNTER
Contacted Pt for endoscopy pre-call for upcoming procedure.  Pre-call complete, Pt does not have any further questions. Arrival time:09:00 am

## 2024-07-10 ENCOUNTER — TELEPHONE (OUTPATIENT)
Dept: ENDOSCOPY | Facility: HOSPITAL | Age: 54
End: 2024-07-10
Payer: COMMERCIAL

## 2024-07-10 ENCOUNTER — HOSPITAL ENCOUNTER (OUTPATIENT)
Facility: HOSPITAL | Age: 54
Discharge: HOME | End: 2024-07-10
Attending: STUDENT IN AN ORGANIZED HEALTH CARE EDUCATION/TRAINING PROGRAM | Admitting: STUDENT IN AN ORGANIZED HEALTH CARE EDUCATION/TRAINING PROGRAM
Payer: COMMERCIAL

## 2024-07-10 ENCOUNTER — ANESTHESIA (OUTPATIENT)
Dept: ENDOSCOPY | Facility: HOSPITAL | Age: 54
End: 2024-07-10
Payer: COMMERCIAL

## 2024-07-10 ENCOUNTER — ANESTHESIA EVENT (OUTPATIENT)
Dept: ENDOSCOPY | Facility: HOSPITAL | Age: 54
End: 2024-07-10
Payer: COMMERCIAL

## 2024-07-10 VITALS
RESPIRATION RATE: 17 BRPM | HEART RATE: 65 BPM | DIASTOLIC BLOOD PRESSURE: 62 MMHG | SYSTOLIC BLOOD PRESSURE: 112 MMHG | TEMPERATURE: 98 F | OXYGEN SATURATION: 99 % | BODY MASS INDEX: 26.18 KG/M2 | HEIGHT: 76 IN | WEIGHT: 215 LBS

## 2024-07-10 DIAGNOSIS — Z12.11 ENCOUNTER FOR SCREENING COLONOSCOPY: ICD-10-CM

## 2024-07-10 PROBLEM — Z86.0100 HISTORY OF COLON POLYPS: Status: ACTIVE | Noted: 2024-07-10

## 2024-07-10 PROBLEM — Z86.010 HISTORY OF COLON POLYPS: Status: ACTIVE | Noted: 2024-07-10

## 2024-07-10 PROCEDURE — 25000003 PHARM REV CODE 250: Performed by: NURSE ANESTHETIST, CERTIFIED REGISTERED

## 2024-07-10 PROCEDURE — 45380 COLONOSCOPY AND BIOPSY: CPT | Mod: PT,59 | Performed by: STUDENT IN AN ORGANIZED HEALTH CARE EDUCATION/TRAINING PROGRAM

## 2024-07-10 PROCEDURE — 25000003 PHARM REV CODE 250: Performed by: STUDENT IN AN ORGANIZED HEALTH CARE EDUCATION/TRAINING PROGRAM

## 2024-07-10 PROCEDURE — 63600175 PHARM REV CODE 636 W HCPCS: Performed by: NURSE ANESTHETIST, CERTIFIED REGISTERED

## 2024-07-10 PROCEDURE — 45380 COLONOSCOPY AND BIOPSY: CPT | Mod: 33,59,, | Performed by: STUDENT IN AN ORGANIZED HEALTH CARE EDUCATION/TRAINING PROGRAM

## 2024-07-10 PROCEDURE — 27201012 HC FORCEPS, HOT/COLD, DISP: Performed by: STUDENT IN AN ORGANIZED HEALTH CARE EDUCATION/TRAINING PROGRAM

## 2024-07-10 PROCEDURE — 45385 COLONOSCOPY W/LESION REMOVAL: CPT | Mod: PT | Performed by: STUDENT IN AN ORGANIZED HEALTH CARE EDUCATION/TRAINING PROGRAM

## 2024-07-10 PROCEDURE — 37000009 HC ANESTHESIA EA ADD 15 MINS: Performed by: STUDENT IN AN ORGANIZED HEALTH CARE EDUCATION/TRAINING PROGRAM

## 2024-07-10 PROCEDURE — 27201089 HC SNARE, DISP (ANY): Performed by: STUDENT IN AN ORGANIZED HEALTH CARE EDUCATION/TRAINING PROGRAM

## 2024-07-10 PROCEDURE — 45385 COLONOSCOPY W/LESION REMOVAL: CPT | Mod: 33,,, | Performed by: STUDENT IN AN ORGANIZED HEALTH CARE EDUCATION/TRAINING PROGRAM

## 2024-07-10 PROCEDURE — 37000008 HC ANESTHESIA 1ST 15 MINUTES: Performed by: STUDENT IN AN ORGANIZED HEALTH CARE EDUCATION/TRAINING PROGRAM

## 2024-07-10 PROCEDURE — 88305 TISSUE EXAM BY PATHOLOGIST: CPT | Mod: 59 | Performed by: STUDENT IN AN ORGANIZED HEALTH CARE EDUCATION/TRAINING PROGRAM

## 2024-07-10 RX ORDER — LIDOCAINE HYDROCHLORIDE 20 MG/ML
INJECTION INTRAVENOUS
Status: DISCONTINUED | OUTPATIENT
Start: 2024-07-10 | End: 2024-07-10

## 2024-07-10 RX ORDER — PROPOFOL 10 MG/ML
INJECTION, EMULSION INTRAVENOUS CONTINUOUS PRN
Status: DISCONTINUED | OUTPATIENT
Start: 2024-07-10 | End: 2024-07-10

## 2024-07-10 RX ORDER — SODIUM CHLORIDE 9 MG/ML
INJECTION, SOLUTION INTRAVENOUS CONTINUOUS
Status: DISCONTINUED | OUTPATIENT
Start: 2024-07-10 | End: 2024-07-10 | Stop reason: HOSPADM

## 2024-07-10 RX ORDER — PROPOFOL 10 MG/ML
VIAL (ML) INTRAVENOUS
Status: DISCONTINUED | OUTPATIENT
Start: 2024-07-10 | End: 2024-07-10

## 2024-07-10 RX ORDER — PHENYLEPHRINE HYDROCHLORIDE 10 MG/ML
INJECTION INTRAVENOUS
Status: DISCONTINUED | OUTPATIENT
Start: 2024-07-10 | End: 2024-07-10

## 2024-07-10 RX ADMIN — Medication 50 MG: at 10:07

## 2024-07-10 RX ADMIN — PROPOFOL 150 MCG/KG/MIN: 10 INJECTION, EMULSION INTRAVENOUS at 10:07

## 2024-07-10 RX ADMIN — GLYCOPYRROLATE 0.2 MG: 0.2 INJECTION, SOLUTION INTRAMUSCULAR; INTRAVENOUS at 10:07

## 2024-07-10 RX ADMIN — LIDOCAINE HYDROCHLORIDE 100 MG: 20 INJECTION INTRAVENOUS at 10:07

## 2024-07-10 RX ADMIN — SODIUM CHLORIDE: 0.9 INJECTION, SOLUTION INTRAVENOUS at 09:07

## 2024-07-10 RX ADMIN — PHENYLEPHRINE HYDROCHLORIDE 100 MCG: 10 INJECTION INTRAVENOUS at 10:07

## 2024-07-10 RX ADMIN — Medication 130 MG: at 10:07

## 2024-07-10 NOTE — PROVATION PATIENT INSTRUCTIONS
Discharge Summary/Instructions after an Endoscopic Procedure  Patient Name: Goldy Irizarry  Patient MRN: [id number]  Patient YOB: 1970  Wednesday, July 10, 2024  Travis Arana MD  Dear patient,  As a result of recent federal legislation (The Federal Cures Act), you may   receive lab or pathology results from your procedure in your [facility]   account before your physician is able to contact you. Your physician or   their representative will relay the results to you with their   recommendations at their soonest availability.  Thank you,  RESTRICTIONS:  During your procedure today, you received medications for sedation.  These   medications may affect your judgment, balance and coordination.  Therefore,   for 24 hours, you have the following restrictions:   - DO NOT drive a car, operate machinery, make legal/financial decisions,   sign important papers or drink alcohol.    ACTIVITY:  Today: no heavy lifting, straining or running due to procedural   sedation/anesthesia.  The following day: return to full activity including work.  DIET:  Eat and drink normally unless instructed otherwise.     TREATMENT FOR COMMON SIDE EFFECTS:  - Mild abdominal pain, nausea, belching, bloating or excessive gas:  rest,   eat lightly and use a heating pad.  - Sore Throat: treat with throat lozenges and/or gargle with warm salt   water.  - Because air was used during the procedure, expelling large amounts of air   from your rectum or belching is normal.  - If a bowel prep was taken, you may not have a bowel movement for 1-3 days.    This is normal.  SYMPTOMS TO WATCH FOR AND REPORT TO YOUR PHYSICIAN:  1. Abdominal pain or bloating, other than gas cramps.  2. Chest pain.  3. Back pain.  4. Signs of infection such as: chills or fever occurring within 24 hours   after the procedure.  5. Rectal bleeding, which would show as bright red, maroon, or black stools.   (A tablespoon of blood from the rectum is not serious, especially  if   hemorrhoids are present.)  6. Vomiting.  7. Weakness or dizziness.  GO DIRECTLY TO THE NEAREST EMERGENCY ROOM IF YOU HAVE ANY OF THE FOLLOWING:      Difficulty breathing              Chills and/or fever over 101 F   Persistent vomiting and/or vomiting blood   Severe abdominal pain   Severe chest pain   Black, tarry stools   Bleeding- more than one tablespoon   Any other symptom or condition that you feel may need urgent attention  Your doctor recommends these additional instructions:  If any biopsies were taken, your doctors clinic will contact you in 1 to 2   weeks with any results.  - Patient has a contact number available for emergencies.  The signs and   symptoms of potential delayed complications were discussed with the   patient.  Return to normal activities tomorrow.  Written discharge   instructions were provided to the patient.   - Discharge patient to home.   - Resume regular diet.   - Continue present medications.   - Await pathology results.   - Repeat colonoscopy in 3 years for surveillance.  For questions, problems or results please call your physician - Travis Arana MD at Work:  [phone], Work:  [phone].  [facility] [place], EMERGENCY ROOM PHONE NUMBER: [phone]  IF A COMPLICATION OR EMERGENCY SITUATION ARISES AND YOU ARE UNABLE TO REACH   YOUR PHYSICIAN - GO DIRECTLY TO THE EMERGENCY ROOM.  MD Travis Mayo MD  7/10/2024 11:05:23 AM  This report has been verified and signed electronically.  Dear patient,  As a result of recent federal legislation (The Federal Cures Act), you may   receive lab or pathology results from your procedure in your [facility]   account before your physician is able to contact you. Your physician or   their representative will relay the results to you with their   recommendations at their soonest availability.  Thank you,  PROVATION

## 2024-07-10 NOTE — ANESTHESIA POSTPROCEDURE EVALUATION
Anesthesia Post Evaluation    Patient: Goldy Irizarry    Procedure(s) Performed: Procedure(s) (LRB):  COLONOSCOPY (N/A)    Final Anesthesia Type: general      Patient location during evaluation: GI PACU  Patient participation: Yes- Able to Participate  Level of consciousness: awake and alert  Post-procedure vital signs: reviewed and stable  Pain management: adequate  Airway patency: patent    PONV status at discharge: No PONV  Anesthetic complications: no      Cardiovascular status: blood pressure returned to baseline and stable  Respiratory status: unassisted, spontaneous ventilation and room air  Hydration status: euvolemic  Follow-up not needed.              Vitals Value Taken Time   /62 07/10/24 1122   Temp 36.4 °C (97.5 °F) 07/10/24 1122   Pulse 65 07/10/24 1122   Resp 17 07/10/24 1122   SpO2 99 % 07/10/24 1122         Event Time   Out of Recovery 11:48:00         Pain/Yadi Score: Yadi Score: 10 (7/10/2024 11:22 AM)

## 2024-07-10 NOTE — ANESTHESIA PREPROCEDURE EVALUATION
07/10/2024  Goldy Irizarry is a 53 y.o., male.    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    Single    One girl 27 (as of 2023).- In [place].  Sib-in law is Cardiology.     No current facility-administered medications on file prior to encounter.     Current Outpatient Medications on File Prior to Encounter   Medication Sig Dispense Refill    cholecalciferol, vitamin D3, 125 mcg (5,000 unit) Tab Take 1 tablet (5,000 Units total) by mouth once daily.       Pre-op Assessment    I have reviewed the NPO Status.      Review of Systems  Anesthesia Hx:               Denies Personal Hx of Anesthesia complications.                    Cardiovascular:  Cardiovascular Normal Exercise tolerance: good                                           Pulmonary:  Pulmonary Normal                       Renal/:  Renal/ Normal                 Hepatic/GI:  Hepatic/GI Normal                 Neurological:  Neurology Normal                                      Endocrine:  Endocrine Normal                Physical Exam    Airway:  Mallampati: I   Neck ROM: Normal ROM        Anesthesia Plan  Type of Anesthesia, risks & benefits discussed:    Anesthesia Type: Gen Natural Airway  Intra-op Monitoring Plan: Standard ASA Monitors  Induction:  IV  Informed Consent: Informed  consent signed with the Patient and all parties understand the risks and agree with anesthesia plan.  All questions answered.   ASA Score: 1    Ready For Surgery From Anesthesia Perspective.     .

## 2024-07-10 NOTE — TRANSFER OF CARE
Anesthesia Transfer of Care Note    Patient: Goldy Nellysford    Procedure(s) Performed: Procedure(s) (LRB):  COLONOSCOPY (N/A)    Patient location: GI    Anesthesia Type: general    Transport from OR: Transported from OR on room air with adequate spontaneous ventilation    Post pain: adequate analgesia    Post assessment: no apparent anesthetic complications    Post vital signs: stable    Level of consciousness: responds to stimulation and sedated    Nausea/Vomiting: no nausea/vomiting    Complications: none    Transfer of care protocol was followed      Last vitals: Visit Vitals  /62   Pulse 64   Temp 97.5   Resp 15   Ht    Wt    SpO2 95%   BMI

## 2024-07-10 NOTE — H&P
Short Stay Endoscopy History and Physical    PCP - Sampson Millan MD    Procedure - Colonoscopy  ASA - per anesthesia  Mallampati - per anesthesia  History of Anesthesia problems - no  Family history Anesthesia problems - no   Plan of anesthesia - per anesthesia    HPI:  This is a 53 y.o. male here for evaluation of : asymptomatic screening exam      ROS:  Constitutional: No fevers, chills, No weight loss  CV: No chest pain  Pulm: No cough, No shortness of breath  GI: see HPI  Derm: No rash    Medical History:  has a past medical history of Vitamin D insufficiency (01/10/2024).    Surgical History:  has no past surgical history on file.    Family History: family history includes Diabetes in his mother; Hypertension in his mother; No Known Problems in his father.. Otherwise no colon cancer, inflammatory bowel disease, or GI malignancies.    Social History:  reports that he has never smoked. He has never used smokeless tobacco. He reports that he does not drink alcohol and does not use drugs.    Review of patient's allergies indicates:  No Known Allergies    Medications:   Medications Prior to Admission   Medication Sig Dispense Refill Last Dose    cholecalciferol, vitamin D3, 125 mcg (5,000 unit) Tab Take 1 tablet (5,000 Units total) by mouth once daily.            Physical Exam:    Vital Signs:   Vitals:    07/10/24 0944   BP: (!) 149/75   Pulse: 80   Resp: 16   Temp: 97.1 °F (36.2 °C)       General Appearance: Well appearing in no acute distress  Eyes:    No scleral icterus  ENT: Neck supple, Lips, mucosa, and tongue normal; teeth and gums normal  Lungs: CTA bilaterally  Heart:  S1, S2 normal, no murmurs heard  Abdomen: Soft, non tender, non distended with positive bowel sounds. No hepatosplenomegaly, ascites, or mass.  Extremities: 2+ pulses, no clubbing, cyanosis or edema  Skin: No rash      Labs:  Lab Results   Component Value Date    WBC 4.14 01/10/2024    HGB 14.4 01/10/2024    HCT 45.5 01/10/2024    PLT  319 01/10/2024    CHOL 156 01/10/2024    TRIG 52 01/10/2024    HDL 50 01/10/2024    ALT 15 01/10/2024    AST 20 01/10/2024     01/10/2024    K 4.3 01/10/2024     01/10/2024    CREATININE 1.2 01/10/2024    BUN 19 01/10/2024    CO2 25 01/10/2024    TSH 1.855 01/10/2024    PSA 3.8 01/10/2024    HGBA1C 4.8 01/10/2024       I have explained the risks and benefits of endoscopy procedures to the patient including but not limited to bleeding, perforation, infection, and death.  The patient was asked if they understand and allowed to ask any further questions to their satisfaction.    Travis Arana MD

## 2024-07-12 LAB
FINAL PATHOLOGIC DIAGNOSIS: NORMAL
GROSS: NORMAL
Lab: NORMAL
MICROSCOPIC EXAM: NORMAL

## 2024-07-22 ENCOUNTER — TELEPHONE (OUTPATIENT)
Dept: HEPATOLOGY | Facility: CLINIC | Age: 54
End: 2024-07-22
Payer: COMMERCIAL

## 2024-07-22 NOTE — TELEPHONE ENCOUNTER
Reached out to pt to discuss results, results given and pt Vu. All questions answered   ----- Message from Travis Arana MD sent at 7/21/2024  5:18 PM CDT -----  Please let Mr. Irizarry know that pathology from his colonoscopy was benign.

## 2025-03-10 ENCOUNTER — OFFICE VISIT (OUTPATIENT)
Dept: INTERNAL MEDICINE | Facility: CLINIC | Age: 55
End: 2025-03-10
Payer: COMMERCIAL

## 2025-03-10 VITALS
SYSTOLIC BLOOD PRESSURE: 128 MMHG | OXYGEN SATURATION: 99 % | DIASTOLIC BLOOD PRESSURE: 70 MMHG | WEIGHT: 218.25 LBS | HEIGHT: 76 IN | HEART RATE: 92 BPM | BODY MASS INDEX: 26.58 KG/M2

## 2025-03-10 DIAGNOSIS — Z23 NEED FOR PNEUMOCOCCAL 20-VALENT CONJUGATE VACCINATION: ICD-10-CM

## 2025-03-10 DIAGNOSIS — Z00.00 ANNUAL PHYSICAL EXAM: Primary | ICD-10-CM

## 2025-03-10 DIAGNOSIS — E55.9 VITAMIN D INSUFFICIENCY: ICD-10-CM

## 2025-03-10 DIAGNOSIS — Z86.0100 HISTORY OF COLON POLYPS: ICD-10-CM

## 2025-03-10 DIAGNOSIS — Z12.5 SCREENING FOR MALIGNANT NEOPLASM OF PROSTATE: ICD-10-CM

## 2025-03-10 DIAGNOSIS — Z23 NEED FOR SHINGLES VACCINE: ICD-10-CM

## 2025-03-10 PROCEDURE — 3074F SYST BP LT 130 MM HG: CPT | Mod: CPTII,S$GLB,, | Performed by: INTERNAL MEDICINE

## 2025-03-10 PROCEDURE — 3078F DIAST BP <80 MM HG: CPT | Mod: CPTII,S$GLB,, | Performed by: INTERNAL MEDICINE

## 2025-03-10 PROCEDURE — 90471 IMMUNIZATION ADMIN: CPT | Mod: S$GLB,,, | Performed by: INTERNAL MEDICINE

## 2025-03-10 PROCEDURE — 99396 PREV VISIT EST AGE 40-64: CPT | Mod: 25,S$GLB,, | Performed by: INTERNAL MEDICINE

## 2025-03-10 PROCEDURE — 90677 PCV20 VACCINE IM: CPT | Mod: S$GLB,,, | Performed by: INTERNAL MEDICINE

## 2025-03-10 PROCEDURE — 99999 PR PBB SHADOW E&M-EST. PATIENT-LVL III: CPT | Mod: PBBFAC,,, | Performed by: INTERNAL MEDICINE

## 2025-03-10 PROCEDURE — 3008F BODY MASS INDEX DOCD: CPT | Mod: CPTII,S$GLB,, | Performed by: INTERNAL MEDICINE

## 2025-03-10 RX ORDER — ZOSTER VACCINE RECOMBINANT, ADJUVANTED 50 MCG/0.5
0.5 KIT INTRAMUSCULAR ONCE
Qty: 1 EACH | Refills: 0 | Status: SHIPPED | OUTPATIENT
Start: 2025-03-10 | End: 2025-03-11

## 2025-03-10 NOTE — PROGRESS NOTES
INTERNAL MEDICINE CLINIC  Follow-up Visit Progress Note     PRESENTING HISTORY     PCP: Sampson Millan MD    Last Clinic Visit with me: 1-    Current Chief Complaint/Problem:    Chief Complaint   Patient presents with    Annual Exam     History of Present Illness & ROS: Mr. Goldy Irizarry is a 54 y.o. male.    Doing well. No complaints.  Discussed colonoscopy findings as requested.    PAST HISTORY:     Past Medical History:   Diagnosis Date    History of colon polyps 07/10/2024    7-2024: Next in 3 years   - One 5 mm polyp in the transverse colon, removed                          with a cold snare. Resected and retrieved.                          - One 2 mm polyp in the transverse colon, removed                          with a jumbo cold forceps. Resected and retrieved.                          - One 5 mm polyp in the descending colon, removed                          with     Vitamin D insufficiency 01/10/2024       Past Surgical History:   Procedure Laterality Date    COLONOSCOPY N/A 7/10/2024    Procedure: COLONOSCOPY;  Surgeon: Travis Arana MD;  Location: [facility] ([address] [place] [address]);  Service: Endoscopy;  Laterality: N/A;  ref by / domonique inst  rescheduled-resent prep/Suflave and mailed instructions-KP  7/2-precall complete-KPvt  7/9-pt moved from extra screening to Dr. Arana[facility]       Family History   Problem Relation Name Age of Onset    Diabetes Mother      Hypertension Mother      No Known Problems Father      Colon cancer Neg Hx      Colon polyps Neg Hx         Social History     Socioeconomic History    Marital status: Single    Number of children: 1   Occupational History    Occupation: commercial ramo   Tobacco Use    Smoking status: Never    Smokeless tobacco: Never   Substance and Sexual Activity    Alcohol use: No     Alcohol/week: 0.0 standard drinks of alcohol    Drug use: No    Sexual activity: Not Currently     Partners: Female   Social History Narrative    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    Single    One girl 27 (as of 2023).- In [place].  Sib-in law is Cardiology.        Pescatarian           MEDICATIONS & ALLERGIES:     Medications Ordered Prior to Encounter[1]     Review of patient's allergies indicates:  No Known Allergies    Medications Reconciliation:   I have reconciled the patient's home medications and discharge medications with the patient/family. I have updated all changes.  Refer to After-Visit Medication List.    OBJECTIVE:     Vital Signs:  Vitals:    03/10/25 1542   BP: 128/70   Pulse: 92     Wt Readings from Last 3 Encounters:   03/10/25 1542 99 kg (218 lb 4.1 oz)   07/10/24 0944 97.5 kg (215 lb)   01/10/24 1057 95.2 kg (209 lb 14.1 oz)     Body mass index is 26.57 kg/m².     Physical Exam:  General: Well developed, well nourished. No distress.  HEENT: Head is normocephalic, atraumatic; ears are normal.    Eyes: Clear conjunctiva.  Neck: Supple, symmetrical neck; trachea midline.  Lungs: Clear to auscultation bilaterally and normal respiratory effort.  Cardiovascular: Heart with regular rate and rhythm.    Extremities: No LE edema.    Abdomen: Abdomen is soft, non-tender non-distended with normal bowel sounds.  Musculoskeletal: Normal gait.   Genital:  Normal penis.    No rash in the genital area.  Scrotum and epididymis normal. No inguinal hernia.  No inguinal nodes.   Rectal: No perianal lesions or rash. Digital exam: prostate 20 g smooth, normal   Lymph Nodes: No cervical, supraclavicular or axillary adenopathy.  Psychiatric: Normal affect. Alert.    Laboratory  Lab Results   Component Value Date    WBC 4.14 01/10/2024    HGB 14.4 01/10/2024    HCT 45.5 01/10/2024     01/10/2024    CHOL 156 01/10/2024    TRIG 52 01/10/2024    HDL 50 01/10/2024    ALT 15 01/10/2024    AST 20 01/10/2024     01/10/2024    K 4.3 01/10/2024     01/10/2024    CREATININE 1.2 01/10/2024    BUN 19 01/10/2024    CO2 25 01/10/2024    TSH 1.855 01/10/2024    PSA  3.8 01/10/2024    HGBA1C 4.8 01/10/2024       ASSESSMENT & PLAN:     Annual physical exam  - Reviewed and updated past and current medical problems.  Discussed treatment of current medical problems     -     Lipid Panel; Future; Expected date: 03/10/2025  -     CBC Auto Differential; Future; Expected date: 03/10/2025  -     Comprehensive Metabolic Panel; Future; Expected date: 03/10/2025  -     TSH; Future; Expected date: 03/10/2025  -     Hemoglobin A1C; Future; Expected date: 03/10/2025  -     PSA, Screening; Future; Expected date: 03/10/2025    Vitamin D insufficiency  -     cholecalciferol, vitamin D3, 125 mcg (5,000 unit) Tab; Take 1 tablet (5,000 Units total) by mouth once daily.    History of Colon Polyps  7-2024: Next in 3 years   - One 5 mm polyp in the transverse colon, removed                          with a cold snare. Resected and retrieved.                          - One 2 mm polyp in the transverse colon, removed                          with a jumbo cold forceps. Resected and retrieved.                          - One 5 mm polyp in the descending colon, removed                          with a cold snare. Resected and retrieved    Preventive Health Maintenance:    Need for pneumococcal 20-valent conjugate vaccination  -     pneumoc 20-timoteo conj-dip cr(PF) (PREVNAR-20 (PF)) injection Syrg 0.5 mL    Need for shingles vaccine  -     varicella-zoster gE-AS01B, PF, (SHINGRIX, PF,) 50 mcg/0.5 mL injection; Inject 0.5 mLs into the muscle once. for 1 dose  Dispense: 1 each; Refill: 0      Return to Clinic for Follow Up with me:   1 year.       Scheduled Follow-up :  Future Appointments   Date Time Provider Department Center   3/12/2025 11:30 AM LAB, APPOINTMENT [facility]         After Visit Medication List :     Medication List            Accurate as of March 10, 2025  4:03 PM. If you have any questions, ask your nurse or doctor.                START taking these medications       SHINGRIX (PF) 50 mcg/0.5 mL injection  Generic drug: varicella-zoster gE-AS01B (PF)  Inject 0.5 mLs into the muscle once. for 1 dose  Started by: Sampson Millan MD            CONTINUE taking these medications      cholecalciferol (vitamin D3) 125 mcg (5,000 unit) Tab  Take 1 tablet (5,000 Units total) by mouth once daily.               Where to Get Your Medications        These medications were sent to [facility]  [address] [place] [id number]      Hours: Mon-Fri, 8a-5:30p Phone: [phone]   SHINGRIX (PF) 50 mcg/0.5 mL injection         Signing Physician:  Sampson Millan MD         [1]   Current Outpatient Medications on File Prior to Visit   Medication Sig Dispense Refill    cholecalciferol, vitamin D3, 125 mcg (5,000 unit) Tab Take 1 tablet (5,000 Units total) by mouth once daily.       No current facility-administered medications on file prior to visit.

## 2025-03-12 ENCOUNTER — LAB VISIT (OUTPATIENT)
Dept: LAB | Facility: HOSPITAL | Age: 55
End: 2025-03-12
Attending: INTERNAL MEDICINE
Payer: COMMERCIAL

## 2025-03-12 DIAGNOSIS — Z00.00 ANNUAL PHYSICAL EXAM: ICD-10-CM

## 2025-03-12 DIAGNOSIS — Z12.5 SCREENING FOR MALIGNANT NEOPLASM OF PROSTATE: ICD-10-CM

## 2025-03-12 LAB
ALBUMIN SERPL BCP-MCNC: 3.5 G/DL (ref 3.5–5.2)
ALP SERPL-CCNC: 55 U/L (ref 40–150)
ALT SERPL W/O P-5'-P-CCNC: 23 U/L (ref 10–44)
ANION GAP SERPL CALC-SCNC: 12 MMOL/L (ref 8–16)
AST SERPL-CCNC: 33 U/L (ref 10–40)
BASOPHILS # BLD AUTO: 0.06 K/UL (ref 0–0.2)
BASOPHILS NFR BLD: 1.6 % (ref 0–1.9)
BILIRUB SERPL-MCNC: 1.5 MG/DL (ref 0.1–1)
BUN SERPL-MCNC: 20 MG/DL (ref 6–20)
CALCIUM SERPL-MCNC: 9.2 MG/DL (ref 8.7–10.5)
CHLORIDE SERPL-SCNC: 104 MMOL/L (ref 95–110)
CHOLEST SERPL-MCNC: 147 MG/DL (ref 120–199)
CHOLEST/HDLC SERPL: 3 {RATIO} (ref 2–5)
CO2 SERPL-SCNC: 22 MMOL/L (ref 23–29)
COMPLEXED PSA SERPL-MCNC: 2.5 NG/ML (ref 0–4)
CREAT SERPL-MCNC: 1.2 MG/DL (ref 0.5–1.4)
DIFFERENTIAL METHOD BLD: ABNORMAL
EOSINOPHIL # BLD AUTO: 0.1 K/UL (ref 0–0.5)
EOSINOPHIL NFR BLD: 3.2 % (ref 0–8)
ERYTHROCYTE [DISTWIDTH] IN BLOOD BY AUTOMATED COUNT: 12.8 % (ref 11.5–14.5)
EST. GFR  (NO RACE VARIABLE): >60 ML/MIN/1.73 M^2
ESTIMATED AVG GLUCOSE: 94 MG/DL (ref 68–131)
GLUCOSE SERPL-MCNC: 66 MG/DL (ref 70–110)
HBA1C MFR BLD: 4.9 % (ref 4–5.6)
HCT VFR BLD AUTO: 42.6 % (ref 40–54)
HDLC SERPL-MCNC: 49 MG/DL (ref 40–75)
HDLC SERPL: 33.3 % (ref 20–50)
HGB BLD-MCNC: 13.5 G/DL (ref 14–18)
IMM GRANULOCYTES # BLD AUTO: 0.01 K/UL (ref 0–0.04)
IMM GRANULOCYTES NFR BLD AUTO: 0.3 % (ref 0–0.5)
LDLC SERPL CALC-MCNC: 89.4 MG/DL (ref 63–159)
LYMPHOCYTES # BLD AUTO: 1.6 K/UL (ref 1–4.8)
LYMPHOCYTES NFR BLD: 42.2 % (ref 18–48)
MCH RBC QN AUTO: 28 PG (ref 27–31)
MCHC RBC AUTO-ENTMCNC: 31.7 G/DL (ref 32–36)
MCV RBC AUTO: 88 FL (ref 82–98)
MONOCYTES # BLD AUTO: 0.9 K/UL (ref 0.3–1)
MONOCYTES NFR BLD: 23.3 % (ref 4–15)
NEUTROPHILS # BLD AUTO: 1.1 K/UL (ref 1.8–7.7)
NEUTROPHILS NFR BLD: 29.4 % (ref 38–73)
NONHDLC SERPL-MCNC: 98 MG/DL
NRBC BLD-RTO: 0 /100 WBC
PLATELET # BLD AUTO: 289 K/UL (ref 150–450)
PMV BLD AUTO: 10 FL (ref 9.2–12.9)
POTASSIUM SERPL-SCNC: 4.1 MMOL/L (ref 3.5–5.1)
PROT SERPL-MCNC: 8 G/DL (ref 6–8.4)
RBC # BLD AUTO: 4.82 M/UL (ref 4.6–6.2)
SODIUM SERPL-SCNC: 138 MMOL/L (ref 136–145)
TRIGL SERPL-MCNC: 43 MG/DL (ref 30–150)
TSH SERPL DL<=0.005 MIU/L-ACNC: 1.41 UIU/ML (ref 0.4–4)
WBC # BLD AUTO: 3.77 K/UL (ref 3.9–12.7)

## 2025-03-12 PROCEDURE — 36415 COLL VENOUS BLD VENIPUNCTURE: CPT | Performed by: INTERNAL MEDICINE

## 2025-03-12 PROCEDURE — 85025 COMPLETE CBC W/AUTO DIFF WBC: CPT | Performed by: INTERNAL MEDICINE

## 2025-03-12 PROCEDURE — 84153 ASSAY OF PSA TOTAL: CPT | Performed by: INTERNAL MEDICINE

## 2025-03-12 PROCEDURE — 84443 ASSAY THYROID STIM HORMONE: CPT | Performed by: INTERNAL MEDICINE

## 2025-03-12 PROCEDURE — 80053 COMPREHEN METABOLIC PANEL: CPT | Performed by: INTERNAL MEDICINE

## 2025-03-12 PROCEDURE — 80061 LIPID PANEL: CPT | Performed by: INTERNAL MEDICINE

## 2025-03-12 PROCEDURE — 83036 HEMOGLOBIN GLYCOSYLATED A1C: CPT | Performed by: INTERNAL MEDICINE

## 2025-03-13 ENCOUNTER — RESULTS FOLLOW-UP (OUTPATIENT)
Dept: INTERNAL MEDICINE | Facility: CLINIC | Age: 55
End: 2025-03-13
Payer: COMMERCIAL

## 2025-03-13 NOTE — LETTER
March 13, 2025    Goldy SALCIDO [id number]             [facility]  [address]  [facility] [address]  Phone: [phone]  Fax: [phone] Dear Mr. Irizarry:    Below are the results from your recent visit:    Resulted Orders   Lipid Panel   Result Value Ref Range    Cholesterol 147 120 - 199 mg/dL      Comment:      The National Cholesterol Education Program (NCEP) has set the  following guidelines (reference ranges) for Cholesterol:  Optimal.....................<200 mg/dL  Borderline High.............200-239 mg/dL  High........................> or = 240 mg/dL      Triglycerides 43 30 - 150 mg/dL      Comment:      The National Cholesterol Education Program (NCEP) has set the  following guidelines (reference values) for triglycerides:  Normal......................<150 mg/dL  Borderline High.............150-199 mg/dL  High........................200-499 mg/dL      HDL 49 40 - 75 mg/dL      Comment:      The National Cholesterol Education Program (NCEP) has set the  following guidelines (reference values) for HDL Cholesterol:  Low...............<40 mg/dL  Optimal...........>60 mg/dL      LDL Cholesterol 89.4 63.0 - 159.0 mg/dL      Comment:      The National Cholesterol Education Program (NCEP) has set the  following guidelines (reference values) for LDL Cholesterol:  Optimal.......................<130 mg/dL  Borderline High...............130-159 mg/dL  High..........................160-189 mg/dL  Very High.....................>190 mg/dL      HDL/Cholesterol Ratio 33.3 20.0 - 50.0 %    Total Cholesterol/HDL Ratio 3.0 2.0 - 5.0    Non-HDL Cholesterol 98 mg/dL      Comment:      Risk category and Non-HDL cholesterol goals:  Coronary heart disease (CHD)or equivalent (10-year risk of CHD >20%):  Non-HDL cholesterol goal     <130 mg/dL  Two or more CHD risk factors and 10-year risk of CHD <= 20%:  Non-HDL cholesterol goal     <160 mg/dL  0 to 1 CHD risk  factor:  Non-HDL cholesterol goal     <190 mg/dL     CBC Auto Differential   Result Value Ref Range    WBC 3.77 (L) 3.90 - 12.70 K/uL    RBC 4.82 4.60 - 6.20 M/uL    Hemoglobin 13.5 (L) 14.0 - 18.0 g/dL    Hematocrit 42.6 40.0 - 54.0 %    MCV 88 82 - 98 fL    MCH 28.0 27.0 - 31.0 pg    MCHC 31.7 (L) 32.0 - 36.0 g/dL    RDW 12.8 11.5 - 14.5 %    Platelets 289 150 - 450 K/uL    MPV 10.0 9.2 - 12.9 fL    Immature Granulocytes 0.3 0.0 - 0.5 %    Gran # (ANC) 1.1 (L) 1.8 - 7.7 K/uL    Immature Grans (Abs) 0.01 0.00 - 0.04 K/uL      Comment:      Mild elevation in immature granulocytes is non specific and   can be seen in a variety of conditions including stress response,   acute inflammation, trauma and pregnancy. Correlation with other   laboratory and clinical findings is essential.      Lymph # 1.6 1.0 - 4.8 K/uL    Mono # 0.9 0.3 - 1.0 K/uL    Eos # 0.1 0.0 - 0.5 K/uL    Baso # 0.06 0.00 - 0.20 K/uL    nRBC 0 0 /100 WBC    Gran % 29.4 (L) 38.0 - 73.0 %    Lymph % 42.2 18.0 - 48.0 %    Mono % 23.3 (H) 4.0 - 15.0 %    Eosinophil % 3.2 0.0 - 8.0 %    Basophil % 1.6 0.0 - 1.9 %    Differential Method Automated    Comprehensive Metabolic Panel   Result Value Ref Range    Sodium 138 136 - 145 mmol/L    Potassium 4.1 3.5 - 5.1 mmol/L    Chloride 104 95 - 110 mmol/L    CO2 22 (L) 23 - 29 mmol/L    Glucose 66 (L) 70 - 110 mg/dL    BUN 20 6 - 20 mg/dL    Creatinine 1.2 0.5 - 1.4 mg/dL    Calcium 9.2 8.7 - 10.5 mg/dL    Total Protein 8.0 6.0 - 8.4 g/dL    Albumin 3.5 3.5 - 5.2 g/dL    Total Bilirubin 1.5 (H) 0.1 - 1.0 mg/dL      Comment:      For infants and newborns, interpretation of results should be based  on gestational age, weight and in agreement with clinical  observations.    Premature Infant recommended reference ranges:  Up to 24 hours.............<8.0 mg/dL  Up to 48 hours............<12.0 mg/dL  3-5 days..................<15.0 mg/dL  6-29 days.................<15.0 mg/dL      Alkaline Phosphatase 55 40 - 150 U/L     AST 33 10 - 40 U/L    ALT 23 10 - 44 U/L    eGFR >60.0 >60 mL/min/1.73 m^2    Anion Gap 12 8 - 16 mmol/L   TSH   Result Value Ref Range    TSH 1.407 0.400 - 4.000 uIU/mL   Hemoglobin A1C   Result Value Ref Range    Hemoglobin A1C 4.9 4.0 - 5.6 %      Comment:      ADA Screening Guidelines:  5.7-6.4%  Consistent with prediabetes  >or=6.5%  Consistent with diabetes    High levels of fetal hemoglobin interfere with the HbA1C  assay. Heterozygous hemoglobin variants (HbS, HgC, etc)do  not significantly interfere with this assay.   However, presence of multiple variants may affect accuracy.      Estimated Avg Glucose 94 68 - 131 mg/dL   PSA, Screening   Result Value Ref Range    PSA, Screen 2.5 0.00 - 4.00 ng/mL      Comment:      The testing method is a chemiluminescent microparticle immunoassay   manufactured by [employer] and performed on the    or   [employer] system. Values obtained with different assay manufacturers   for   methods may be different and cannot be used interchangeably.  PSA Expected levels:  Hormonal Therapy: <0.05 ng/ml  Prostatectomy: <0.01 ng/ml  Radiation Therapy: <1.00 ng/ml       Your results are within an acceptable range.  Please don't hesitate to call our office if you have any questions or concerns.    Sincerely,        Sampson Millan MD

## 2025-03-13 NOTE — LETTER
March 13, 2025    Goldy SALCIDO [id number]             [facility]  [address]  [facility] [address]  Phone: [phone]  Fax: [phone] Dear Mr. Irizarry:    Below are the results from your recent visit:    Resulted Orders   Lipid Panel   Result Value Ref Range    Cholesterol 147 120 - 199 mg/dL      Comment:      The National Cholesterol Education Program (NCEP) has set the  following guidelines (reference ranges) for Cholesterol:  Optimal.....................<200 mg/dL  Borderline High.............200-239 mg/dL  High........................> or = 240 mg/dL      Triglycerides 43 30 - 150 mg/dL      Comment:      The National Cholesterol Education Program (NCEP) has set the  following guidelines (reference values) for triglycerides:  Normal......................<150 mg/dL  Borderline High.............150-199 mg/dL  High........................200-499 mg/dL      HDL 49 40 - 75 mg/dL      Comment:      The National Cholesterol Education Program (NCEP) has set the  following guidelines (reference values) for HDL Cholesterol:  Low...............<40 mg/dL  Optimal...........>60 mg/dL      LDL Cholesterol 89.4 63.0 - 159.0 mg/dL      Comment:      The National Cholesterol Education Program (NCEP) has set the  following guidelines (reference values) for LDL Cholesterol:  Optimal.......................<130 mg/dL  Borderline High...............130-159 mg/dL  High..........................160-189 mg/dL  Very High.....................>190 mg/dL      HDL/Cholesterol Ratio 33.3 20.0 - 50.0 %    Total Cholesterol/HDL Ratio 3.0 2.0 - 5.0    Non-HDL Cholesterol 98 mg/dL      Comment:      Risk category and Non-HDL cholesterol goals:  Coronary heart disease (CHD)or equivalent (10-year risk of CHD >20%):  Non-HDL cholesterol goal     <130 mg/dL  Two or more CHD risk factors and 10-year risk of CHD <= 20%:  Non-HDL cholesterol goal     <160 mg/dL  0 to 1 CHD risk  factor:  Non-HDL cholesterol goal     <190 mg/dL     CBC Auto Differential   Result Value Ref Range    WBC 3.77 (L) 3.90 - 12.70 K/uL    RBC 4.82 4.60 - 6.20 M/uL    Hemoglobin 13.5 (L) 14.0 - 18.0 g/dL    Hematocrit 42.6 40.0 - 54.0 %    MCV 88 82 - 98 fL    MCH 28.0 27.0 - 31.0 pg    MCHC 31.7 (L) 32.0 - 36.0 g/dL    RDW 12.8 11.5 - 14.5 %    Platelets 289 150 - 450 K/uL    MPV 10.0 9.2 - 12.9 fL    Immature Granulocytes 0.3 0.0 - 0.5 %    Gran # (ANC) 1.1 (L) 1.8 - 7.7 K/uL    Immature Grans (Abs) 0.01 0.00 - 0.04 K/uL      Comment:      Mild elevation in immature granulocytes is non specific and   can be seen in a variety of conditions including stress response,   acute inflammation, trauma and pregnancy. Correlation with other   laboratory and clinical findings is essential.      Lymph # 1.6 1.0 - 4.8 K/uL    Mono # 0.9 0.3 - 1.0 K/uL    Eos # 0.1 0.0 - 0.5 K/uL    Baso # 0.06 0.00 - 0.20 K/uL    nRBC 0 0 /100 WBC    Gran % 29.4 (L) 38.0 - 73.0 %    Lymph % 42.2 18.0 - 48.0 %    Mono % 23.3 (H) 4.0 - 15.0 %    Eosinophil % 3.2 0.0 - 8.0 %    Basophil % 1.6 0.0 - 1.9 %    Differential Method Automated    Comprehensive Metabolic Panel   Result Value Ref Range    Sodium 138 136 - 145 mmol/L    Potassium 4.1 3.5 - 5.1 mmol/L    Chloride 104 95 - 110 mmol/L    CO2 22 (L) 23 - 29 mmol/L    Glucose 66 (L) 70 - 110 mg/dL    BUN 20 6 - 20 mg/dL    Creatinine 1.2 0.5 - 1.4 mg/dL    Calcium 9.2 8.7 - 10.5 mg/dL    Total Protein 8.0 6.0 - 8.4 g/dL    Albumin 3.5 3.5 - 5.2 g/dL    Total Bilirubin 1.5 (H) 0.1 - 1.0 mg/dL      Comment:      For infants and newborns, interpretation of results should be based  on gestational age, weight and in agreement with clinical  observations.    Premature Infant recommended reference ranges:  Up to 24 hours.............<8.0 mg/dL  Up to 48 hours............<12.0 mg/dL  3-5 days..................<15.0 mg/dL  6-29 days.................<15.0 mg/dL      Alkaline Phosphatase 55 40 - 150 U/L     AST 33 10 - 40 U/L    ALT 23 10 - 44 U/L    eGFR >60.0 >60 mL/min/1.73 m^2    Anion Gap 12 8 - 16 mmol/L   TSH   Result Value Ref Range    TSH 1.407 0.400 - 4.000 uIU/mL   Hemoglobin A1C   Result Value Ref Range    Hemoglobin A1C 4.9 4.0 - 5.6 %      Comment:      ADA Screening Guidelines:  5.7-6.4%  Consistent with prediabetes  >or=6.5%  Consistent with diabetes    High levels of fetal hemoglobin interfere with the HbA1C  assay. Heterozygous hemoglobin variants (HbS, HgC, etc)do  not significantly interfere with this assay.   However, presence of multiple variants may affect accuracy.      Estimated Avg Glucose 94 68 - 131 mg/dL   PSA, Screening   Result Value Ref Range    PSA, Screen 2.5 0.00 - 4.00 ng/mL      Comment:      The testing method is a chemiluminescent microparticle immunoassay   manufactured by [employer] and performed on the    or   [employer] system. Values obtained with different assay manufacturers   for   methods may be different and cannot be used interchangeably.  PSA Expected levels:  Hormonal Therapy: <0.05 ng/ml  Prostatectomy: <0.01 ng/ml  Radiation Therapy: <1.00 ng/ml       Your results are within an acceptable range.  Please don't hesitate to call our office if you have any questions or concerns.    Sincerely,        Sampson Millan MD

## 2025-04-02 ENCOUNTER — TELEPHONE (OUTPATIENT)
Dept: CARDIOLOGY | Facility: CLINIC | Age: 55
End: 2025-04-02
Payer: COMMERCIAL

## 2025-04-02 DIAGNOSIS — Q21.12 PFO (PATENT FORAMEN OVALE): Primary | ICD-10-CM

## 2025-04-02 NOTE — TELEPHONE ENCOUNTER
Successful contact to patient.  Referral after patient with hospital event, discovery of PFO.  Per Dr Worthy patient is to be set up with clinic appt and ECHO before seen.  Assisted patient with appointments and instructed on dates/times of testing/appts.  Pt to call if issues with transportation.

## 2025-04-03 ENCOUNTER — DOCUMENTATION ONLY (OUTPATIENT)
Dept: CARDIOLOGY | Facility: CLINIC | Age: 55
End: 2025-04-03
Payer: COMMERCIAL

## 2025-04-03 ENCOUNTER — TELEPHONE (OUTPATIENT)
Dept: CARDIOLOGY | Facility: CLINIC | Age: 55
End: 2025-04-03
Payer: COMMERCIAL

## 2025-04-03 RX ORDER — CLOPIDOGREL BISULFATE 75 MG/1
1 TABLET ORAL DAILY
COMMUNITY
Start: 2025-04-01 | End: 2026-04-01

## 2025-04-03 RX ORDER — ATORVASTATIN CALCIUM 80 MG/1
80 TABLET, FILM COATED ORAL
COMMUNITY
Start: 2025-03-29 | End: 2026-03-29

## 2025-04-03 RX ORDER — NAPROXEN SODIUM 220 MG/1
1 TABLET, FILM COATED ORAL DAILY
COMMUNITY
Start: 2025-03-30 | End: 2026-03-30

## 2025-04-03 NOTE — TELEPHONE ENCOUNTER
Spoke with patient.  Confirmed JAYLON 4/4/25.  Discussed pre procedure instructions, arrival time and location with patient.  Pt with good understanding.

## 2025-04-03 NOTE — PROGRESS NOTES
You have been scheduled for a procedure in the echo lab on Friday, April 4, 2025.     Please report to the Cardiology Waiting Area on the Third floor of the hospital and check in at 10:30  AM.     You will then be taken to the SSCU (Short Stay Cardiac Unit) and prepared for your procedure. Please be aware that this is not the time of your procedure but the time you are to arrive. The procedures are scheduled on an hourly basis; however, emergency cases take precedence over all other cases.     You may not have anything to eat or drink after midnight the night before your test.     You may take your regular morning medications with water.    If you take a weekly GLP-1 Inhibitor (Ozempic, Semaglutide, etc) You must be off that medication for one full week prior to your procedure.  Anesthesia will cancel your procedure if you do not hold this medication for a minimum of 7 days prior to procedure.    The procedure will take 1-2 hours to perform. After the procedure, you will return to SSCU on the third floor of the hospital. Your family may remain in the room with you during this time.     You will be discharged home that same afternoon. You must have someone to drive you home.  Your doctor will determine, based on your progress, the time of your discharge. The results of your procedure will be discussed with you before you are discharged. Any further testing or procedures will be scheduled for you either before you leave or you will be called with these appointments.       If you should have any questions, concerns, or need to change the date of your procedure, please call  Laya PARKER at [phone]      Special Instructions:    Continue to take your current medications.          THE ABOVE INSTRUCTIONS WERE GIVEN TO THE PATIENT VERBALLY AND THEY VERBALIZED UNDERSTANDING.  THEY DO NOT REQUIRE ANY SPECIAL NEEDS AND DO NOT HAVE ANY LEARNING BARRIERS.          Directions for Reporting to Cardiology Waiting Area in the  Hospital  If you park in the Parking Garage:  Take elevators to the1st floor of the parking garage.  Continue past the gift shop, coffee shop, and piano.  Take a right and go to the gold elevators. (Elevator B)  Take the elevator to the 3rd floor.  Follow the arrow on the sign on the wall that says Cath Lab Registration/EP Lab Registration.  Follow the long hallway all the way around until you come to a big open area.  This is the registration area.  Check in at Reception Desk.    OR    If family is dropping you off:  Have them drop you off at the front of the Hospital under the green overhang.  Enter through the doors and take a right.  Take the E elevators to the 3rd floor Cardiology Waiting Area.  Check in at the Reception Desk in the waiting room.

## 2025-04-04 ENCOUNTER — HOSPITAL ENCOUNTER (OUTPATIENT)
Facility: HOSPITAL | Age: 55
Discharge: HOME | End: 2025-04-04
Attending: INTERNAL MEDICINE | Admitting: INTERNAL MEDICINE
Payer: COMMERCIAL

## 2025-04-04 ENCOUNTER — ANESTHESIA (OUTPATIENT)
Dept: MEDSURG UNIT | Facility: HOSPITAL | Age: 55
End: 2025-04-04
Payer: COMMERCIAL

## 2025-04-04 ENCOUNTER — HOSPITAL ENCOUNTER (OUTPATIENT)
Dept: CARDIOLOGY | Facility: HOSPITAL | Age: 55
Discharge: HOME | End: 2025-04-04
Attending: INTERNAL MEDICINE | Admitting: INTERNAL MEDICINE
Payer: COMMERCIAL

## 2025-04-04 ENCOUNTER — ANESTHESIA EVENT (OUTPATIENT)
Dept: MEDSURG UNIT | Facility: HOSPITAL | Age: 55
End: 2025-04-04
Payer: COMMERCIAL

## 2025-04-04 VITALS
HEIGHT: 76 IN | RESPIRATION RATE: 16 BRPM | WEIGHT: 218 LBS | TEMPERATURE: 98 F | BODY MASS INDEX: 26.55 KG/M2 | SYSTOLIC BLOOD PRESSURE: 139 MMHG | HEART RATE: 65 BPM | OXYGEN SATURATION: 99 % | DIASTOLIC BLOOD PRESSURE: 87 MMHG

## 2025-04-04 VITALS — SYSTOLIC BLOOD PRESSURE: 150 MMHG | BODY MASS INDEX: 26.54 KG/M2 | WEIGHT: 218 LBS | DIASTOLIC BLOOD PRESSURE: 83 MMHG

## 2025-04-04 DIAGNOSIS — Q21.12 PFO (PATENT FORAMEN OVALE): ICD-10-CM

## 2025-04-04 PROCEDURE — 93312 ECHO TRANSESOPHAGEAL: CPT | Mod: 26,,, | Performed by: INTERNAL MEDICINE

## 2025-04-04 PROCEDURE — 63600175 PHARM REV CODE 636 W HCPCS: Performed by: NURSE ANESTHETIST, CERTIFIED REGISTERED

## 2025-04-04 PROCEDURE — 37000008 HC ANESTHESIA 1ST 15 MINUTES

## 2025-04-04 PROCEDURE — 93325 DOPPLER ECHO COLOR FLOW MAPG: CPT | Mod: 26,,, | Performed by: INTERNAL MEDICINE

## 2025-04-04 PROCEDURE — 25000003 PHARM REV CODE 250: Performed by: NURSE ANESTHETIST, CERTIFIED REGISTERED

## 2025-04-04 PROCEDURE — 93320 DOPPLER ECHO COMPLETE: CPT | Mod: 26,,, | Performed by: INTERNAL MEDICINE

## 2025-04-04 PROCEDURE — 37000009 HC ANESTHESIA EA ADD 15 MINS

## 2025-04-04 PROCEDURE — 93320 DOPPLER ECHO COMPLETE: CPT

## 2025-04-04 RX ORDER — MIDAZOLAM HYDROCHLORIDE 1 MG/ML
INJECTION INTRAMUSCULAR; INTRAVENOUS
Status: DISCONTINUED | OUTPATIENT
Start: 2025-04-04 | End: 2025-04-04

## 2025-04-04 RX ORDER — LIDOCAINE HYDROCHLORIDE 20 MG/ML
INJECTION INTRAVENOUS
Status: DISCONTINUED | OUTPATIENT
Start: 2025-04-04 | End: 2025-04-04

## 2025-04-04 RX ORDER — ONDANSETRON HYDROCHLORIDE 2 MG/ML
4 INJECTION, SOLUTION INTRAVENOUS DAILY PRN
OUTPATIENT
Start: 2025-04-04

## 2025-04-04 RX ORDER — PROPOFOL 10 MG/ML
VIAL (ML) INTRAVENOUS CONTINUOUS PRN
Status: DISCONTINUED | OUTPATIENT
Start: 2025-04-04 | End: 2025-04-04

## 2025-04-04 RX ORDER — GLUCAGON 1 MG
1 KIT INJECTION
OUTPATIENT
Start: 2025-04-04

## 2025-04-04 RX ORDER — DEXMEDETOMIDINE HYDROCHLORIDE 100 UG/ML
INJECTION, SOLUTION INTRAVENOUS
Status: DISCONTINUED | OUTPATIENT
Start: 2025-04-04 | End: 2025-04-04

## 2025-04-04 RX ORDER — SODIUM CHLORIDE 0.9 % (FLUSH) 0.9 %
10 SYRINGE (ML) INJECTION
Status: DISCONTINUED | OUTPATIENT
Start: 2025-04-04 | End: 2025-04-04 | Stop reason: HOSPADM

## 2025-04-04 RX ADMIN — PROPOFOL 120 MG: 10 INJECTION, EMULSION INTRAVENOUS at 11:04

## 2025-04-04 RX ADMIN — DEXMEDETOMIDINE 8 MCG: 200 INJECTION, SOLUTION INTRAVENOUS at 11:04

## 2025-04-04 RX ADMIN — PROPOFOL 200 MCG/KG/MIN: 10 INJECTION, EMULSION INTRAVENOUS at 11:04

## 2025-04-04 RX ADMIN — MIDAZOLAM HYDROCHLORIDE 2 MG: 2 INJECTION, SOLUTION INTRAMUSCULAR; INTRAVENOUS at 11:04

## 2025-04-04 RX ADMIN — LIDOCAINE HYDROCHLORIDE 100 MG: 20 INJECTION INTRAVENOUS at 11:04

## 2025-04-04 RX ADMIN — SODIUM CHLORIDE: 0.9 INJECTION, SOLUTION INTRAVENOUS at 11:04

## 2025-04-04 NOTE — ANESTHESIA POSTPROCEDURE EVALUATION
Anesthesia Post Evaluation    Patient: Goldy Juan C    Procedure(s) Performed: Procedure(s) (LRB):  Transesophageal echo (JAYLON) intra-procedure log documentation (N/A)    Final Anesthesia Type: general      Patient location during evaluation: PACU  Patient participation: Yes- Able to Participate  Level of consciousness: awake and alert and oriented  Post-procedure vital signs: reviewed and stable  Pain management: adequate  Airway patency: patent    PONV status at discharge: No PONV  Anesthetic complications: no      Cardiovascular status: blood pressure returned to baseline and hemodynamically stable  Respiratory status: unassisted, spontaneous ventilation and room air  Hydration status: euvolemic  Follow-up not needed.              Vitals Value Taken Time   /100 04/04/25 13:33   Temp 36.5 °C (97.7 °F) 04/04/25 12:30   Pulse 71 04/04/25 13:40   Resp 14 04/04/25 13:38   SpO2 100 % 04/04/25 13:40   Vitals shown include unfiled device data.      No case tracking events are documented in the log.      Pain/Yadi Score: Yadi Score: 10 (4/4/2025  1:15 PM)

## 2025-04-04 NOTE — ANESTHESIA PREPROCEDURE EVALUATION
04/04/2025  Goldy Irizarry is a 54 y.o., male.      Pre-op Assessment    I have reviewed the Patient Summary Reports.     I have reviewed the Nursing Notes. I have reviewed the NPO Status.   I have reviewed the Medications.     Review of Systems  Anesthesia Hx:  No problems with previous Anesthesia   History of prior surgery of interest to airway management or planning:            Denies Personal Hx of Anesthesia complications.                    Social:  No Alcohol Use, Non-Smoker       Hematology/Oncology:  Hematology Normal   Oncology Normal                                   EENT/Dental:  EENT/Dental Normal           Cardiovascular:  Exercise tolerance: good          Denies Angina.       Denies CHAO.   PFO                           Pulmonary:  Pulmonary Normal                       Renal/:  Renal/ Normal                 Hepatic/GI:  Hepatic/GI Normal     Denies GERD.                Musculoskeletal:  Musculoskeletal Normal                Neurological:   CVA, no residual symptoms                                    Endocrine:  Endocrine Normal            Dermatological:  Skin Normal    Psych:  Psychiatric Normal                  Physical Exam  General: Well nourished, Cooperative, Alert and Oriented    Airway:  Mallampati: II / I  Mouth Opening: Normal  TM Distance: Normal  Tongue: Normal  Neck ROM: Normal ROM    Dental:  Intact    Chest/Lungs:  Normal Respiratory Rate    Heart:  Rate: Normal    Anesthesia Plan  Type of Anesthesia, risks & benefits discussed:    Anesthesia Type: Gen Natural Airway  Intra-op Monitoring Plan: Standard ASA Monitors  Post Op Pain Control Plan: multimodal analgesia  Induction:  IV  Informed Consent: Informed consent signed with the Patient and all parties understand the risks and agree with anesthesia plan.  All questions answered.   ASA Score: 3  Day of Surgery Review of  History & Physical: H&P Update referred to the surgeon/provider.  Anesthesia Plan Notes:   4/4/25  Patient had a stroke about 1 week ago, received treatment, no residual symptoms.  -ROSITA Gonzales MD    Ready For Surgery From Anesthesia Perspective.   .

## 2025-04-04 NOTE — H&P
[facility]  JAYLON History and Physical      Goldy Irizarry  YOB: 1970  Medical Record Number:  [id number]  Attending Physician:  Garo Jean Baptiste MD   Date of Admission: 4/4/2025       Hospital Day:  0  Current Principal Problem:  H/o CVA    Patient information obtained from the patient and by chart review.     History     Cc: H/o CVA    HPI  Goldy Irizarry is a(n) 54 y man with no known PMH who started developing LSW and LFD at 17:30 pm on 3/27. A coworker found him at work and brought him into the ED wherein he was stroke activated. CTH without acute bleed. CTA with R M1 occlusion. Patient evaluated by Tele Neurology who prescribed TNK, given at 19:00. Patient then taken emergently by neurointerventional for mechanical thrombectomy. TICI3 reperfusion of the R MCA was achieved. He was admitted to the NICU for close neurological monitoring and stroke workup.      Over the course of his stay his neurologic exam improved. His strength on the L improved to 4+/5, with residual L pronator drift and left facial droop. He had an MRI which showed AIS in R BG, no edema or mass effect. He was started on Aspirin monotherapy. He was initiated on Atorvastatin 80mg for an LDL of 119.  His TTE revealed a + bubble and Cardiology was consulted for possible ILD and/or PFO closure. He had a hypercoagulability workup sent which was positive for elevated CRP, ESR. His CXR showed diffuse interstitial disease and a CT chest was done for further evaluation. CT Chest revealed extensive ILD and bilateral hilar lymphadenopathy, concerning for Sarcoidosis. Pulmonology was consulted.      His vital signed remained stable. His basic laboratory analysis was largely unremarkable. He worked well with PT/OT who recommended outpatient therapy. He was deemed safe for discharge with close follow up with PCP, Cardiology, and Pulmonology. Patient expressed all understanding.    Today, he presents for JAYLON  to eval PFO. He is doing well and has no acute complaints.       Anticoagulant/antiplatelets: aspirin 81 mg qD and plavix 75 mg qD  Platelet count: 289  INR: 1.1    History of stroke:  yes  Dysphagia or odynophagia:  no  Liver Disease, esophageal disease, or known varices:  no  Upper GI Bleeding:  no  Snoring:  no   Sleep Apnea:  no  Prior neck surgery or radiation:  no  History of anesthetic difficulties:  no  Family history of anesthetic difficulties:  no  Last oral intake: last pm   Able to move neck in all directions:  yes  Use of GLP-1:  no        Medications - Outpatient  Prior to Admission medications    Medication Sig Start Date End Date Taking? Authorizing Provider   aspirin 81 MG Chew Take 1 tablet by mouth once daily. 3/30/25 3/30/26  Provider, Historical   atorvastatin (LIPITOR) 80 MG tablet Take 80 mg by mouth. 3/29/25 3/29/26  Provider, Historical   cholecalciferol, vitamin D3, 125 mcg (5,000 unit) Tab Take 1 tablet (5,000 Units total) by mouth once daily. 1/10/24   Sampson Millan MD   clopidogreL (PLAVIX) 75 mg tablet Take 1 tablet by mouth once daily. 4/1/25 4/1/26  Provider, Historical         Medications - Current  Scheduled Meds:  Continuous Infusions:  PRN Meds:.      Allergies  Review of patient's allergies indicates:  No Known Allergies      Past Medical History  Past Medical History:   Diagnosis Date    History of colon polyps 07/10/2024    7-2024: Next in 3 years   - One 5 mm polyp in the transverse colon, removed                          with a cold snare. Resected and retrieved.                          - One 2 mm polyp in the transverse colon, removed                          with a jumbo cold forceps. Resected and retrieved.                          - One 5 mm polyp in the descending colon, removed                          with     Vitamin D insufficiency 01/10/2024         Past Surgical History  Past Surgical History:   Procedure Laterality Date    COLONOSCOPY N/A 7/10/2024    Procedure:  COLONOSCOPY;  Surgeon: Travis Arana MD;  Location: [facility] ([address] [place] [address]);  Service: Endoscopy;  Laterality: N/A;  ref by / domonique inst  rescheduled-resent prep/Suflave and mailed instructions-KP  7/2-precall complete-KPvt  7/9-pt moved from extra screening to Dr. 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    Single    One girl 27 (as of 2023).- In [place].  Sib-in law is Cardiology.        Pescatarian         Social Drivers of Health     Food Insecurity: No Food Insecurity (3/28/2025)    Received from [facility]    Hunger Vital Sign     Worried About Running Out of Food in the Last Year: Never true     Ran Out of Food in the Last Year: Never true   Transportation Needs: No Transportation Needs (3/28/2025)    Received from [facility]    PRAPARE - Transportation     Lack of Transportation (Medical): No     Lack of Transportation (Non-Medical): No   Housing Stability: Low Risk  (3/28/2025)    Received from [facility]    Housing Stability Vital Sign     Unable to Pay for Housing in the Last Year: No     Number of Times Moved in the Last Year: 0     Homeless in the Last Year: No         ROS  10 point ROS performed and negative except as stated in HPI     Physical Examination         Vital Signs  24 Hour VS Range       No intake or output data in the 24 hours ending 04/04/25 1037      Physical Exam:   Constitutional: no acute distress  HEENT: NCAT, EOMI, no scleral icterus  Cardiovascular: Regular rate and rhythm   Pulmonary: Normal respiratory effort  "  Abdomen: nontender, non-distended   Neuro: alert and oriented, no focal deficits  Extremities: warm, no edema   MSK: no deformities  Integument: intact, no rashes         Data       No results for input(s): "WBC", "HGB", "HCT", "PLT" in the last 168 hours.     No results for input(s): "PROTIME", "INR" in the last 168 hours.     Recent Labs   Lab 03/29/25  0325      K 4.1   CO2 28   BUN 19.1   CREATININE 1.25   ANIONGAP 6*   CALCIUM 8.6        No results for input(s): "PROT", "ALBUMIN", "BILITOT", "ALKPHOS", "AST", "ALT" in the last 168 hours.     No results for input(s): "TROPONINI" in the last 168 hours.     No results found for: "BNP"    No results for input(s): "LABBLOO" in the last 168 hours.         Assessment & Plan     # H/o CVA and PFO    Last TTE/JAYLON:  TTE 3/28/2025    Mild left ventricular hypertrophy.     Hyperdynamic left ventricular systolic function.     Left ventricular ejection fraction is estimated at 70-75 %.     Indeterminate diastolic function.     Normal right ventricular systolic function.     RVSP could not be calculated due to incomplete tricuspid regurgitation velocity profile.     A bubble study was done and was positive with valsalva.       -No absolute contraindications of esophageal stricture, tumor, perforation, laceration,or diverticulum and/or active GI bleed.  -The risks, benefits & alternatives of the procedure were explained to the patient.   -The risks of transesophageal echo include but are not limited to:  Dental trauma, esophageal trauma/perforation, bleeding, laryngospasm/brochospasm, aspiration, sore throat/hoarseness, & dislodgement of the endotracheal tube/nasogastric tube (where applicable).    -The risks of moderate sedation include hypotension, respiratory depression, arrhythmias, bronchospasm, & death.    -Informed consent was obtained. The patient is agreeable to proceed with the procedure and all questions and concerns addressed.    Case was discussed with an " attending physician in echocardiography lab prior to procedure.    Santa Montez PA-C  [facility]

## 2025-04-04 NOTE — DISCHARGE INSTRUCTIONS
Medications:  -Continue to take your home medications as listed on your medication list after you are discharged.    Diet  -You may resume oral intake after you are discharged, as long you have no swallowing difficulties.    Because you have received sedation for this procedure:  -Limit activity for the remainder of the day.  -Do not smoke for at least 6 hours and until you are fully awake and alert.  -Do not drink alcoholic beverage for 24 hours.  -Do not drive for 24 hours.  -Defer important decision making until the following day.     Go to the Emergency Department if you develop:   -Bleeding  -Weakness or numbness  -Visual, gait or speech disturbance  -New chest pain, palpitations, shortness of breath, rapid heart beat, or fainting  -Fever

## 2025-04-04 NOTE — HOSPITAL COURSE
Successful JAYLON. Patient tolerated the procedure without any acute complications. Plan to continue all home medications. Instructed to follow up with General Cardiologist.   Patient was assessed at bedside prior to discharge, they reported feeling well and denied chest discomfort, shortness of breath, palpitations, lightheadedness, or any other acute symptoms. Discharge instructions were discussed with patient and all questions were answered. Patient was discharged home in stable condition.

## 2025-04-04 NOTE — DISCHARGE SUMMARY
[facility] Short Stay Cardiac [facility]  Cardiology  Discharge Summary      Patient Name: Goldy Irizarry  MRN: [id number]  Admission Date: 4/4/2025  Hospital Length of Stay: 0 days  Discharge Date and Time: 04/04/2025 1:20 PM  Attending Physician: Garo Jean Baptiste MD    Discharging Provider: Santa Montez PA-C  Primary Care Physician: Sampson Millan MD    HPI:   Goldy Irizarry is a(n) 54 y man with no known PMH who started developing LSW and LFD at 17:30 pm on 3/27. A coworker found him at work and brought him into the ED wherein he was stroke activated. CTH without acute bleed. CTA with R M1 occlusion. Patient evaluated by Tele Neurology who prescribed TNK, given at 19:00. Patient then taken emergently by neurointerventional for mechanical thrombectomy. TICI3 reperfusion of the R MCA was achieved. He was admitted to the NICU for close neurological monitoring and stroke workup.      Over the course of his stay his neurologic exam improved. His strength on the L improved to 4+/5, with residual L pronator drift and left facial droop. He had an MRI which showed AIS in R BG, no edema or mass effect. He was started on Aspirin monotherapy. He was initiated on Atorvastatin 80mg for an LDL of 119.  His TTE revealed a + bubble and Cardiology was consulted for possible ILD and/or PFO closure. He had a hypercoagulability workup sent which was positive for elevated CRP, ESR. His CXR showed diffuse interstitial disease and a CT chest was done for further evaluation. CT Chest revealed extensive ILD and bilateral hilar lymphadenopathy, concerning for Sarcoidosis. Pulmonology was consulted.      His vital signed remained stable. His basic laboratory analysis was largely unremarkable. He worked well with PT/OT who recommended outpatient therapy. He was deemed safe for discharge with close follow up with PCP, Cardiology, and Pulmonology. Patient expressed all understanding.     Today, he presents for JAYLON to eval PFO. He is doing  well and has no acute complaints.      Hospital Course:  Successful JAYLON. Patient tolerated the procedure without any acute complications. Plan to continue all home medications. Instructed to follow up with General Cardiologist.   Patient was assessed at bedside prior to discharge, they reported feeling well and denied chest discomfort, shortness of breath, palpitations, lightheadedness, or any other acute symptoms. Discharge instructions were discussed with patient and all questions were answered. Patient was discharged home in stable condition.        Goals of Care Treatment Preferences:         Consults:     Significant Diagnostic Studies: N/A  Preliminary JAYLON report unavailable at time of discharge. No PFO per Dr. Vasquez.     Pending Diagnostic Studies:       None            There are no hospital problems to display for this patient.    No new Assessment & Plan notes have been filed under this hospital service since the last note was generated.  Service: Cardiology      Discharged Condition: stable    Disposition: Home or Self Care    Follow Up:    Patient Instructions:   No discharge procedures on file.  Medications:  Reconciled Home Medications:      Medication List        CONTINUE taking these medications      aspirin 81 MG Chew  Take 1 tablet by mouth once daily.     atorvastatin 80 MG tablet  Commonly known as: LIPITOR  Take 80 mg by mouth.     cholecalciferol (vitamin D3) 125 mcg (5,000 unit) Tab  Take 1 tablet (5,000 Units total) by mouth once daily.     clopidogreL 75 mg tablet  Commonly known as: PLAVIX  Take 1 tablet by mouth once daily.              Time spent on the discharge of patient: 35 minutes    Santa Montez PA-C  Cardiology  [facility] Short Stay Cardiac [facility]

## 2025-04-04 NOTE — PLAN OF CARE
Received report from Shay PARKER  EP and CRNA. Pt is s/p JAYLON. Pt is mildly sedated. Vss resp even and unlabored. Pt placed on continuous bedside monitoring. RN at bedside. Will monitor

## 2025-04-04 NOTE — TRANSFER OF CARE
"Anesthesia Transfer of Care Note    Patient: Goldy [place]    Procedure(s) Performed: Procedure(s) (LRB):  Transesophageal echo (JAYLON) intra-procedure log documentation (N/A)    Patient location: PACU    Anesthesia Type: general    Transport from OR: Transported from OR on room air with adequate spontaneous ventilation    Post pain: adequate analgesia    Post assessment: no apparent anesthetic complications    Post vital signs: stable    Level of consciousness: responds to stimulation    Nausea/Vomiting: no nausea/vomiting    Complications: none    Transfer of care protocol was followed      Last vitals: Visit Vitals  BP (!) 130/80 (BP Location: Left arm, Patient Position: Sitting)   Pulse 78   Temp 37 °C (98.6 °F) (Temporal)   Resp 19   Ht 6' 4" (1.93 m)   Wt 98.9 kg (218 lb)   SpO2 98%   BMI 26.54 kg/m²     "

## 2025-04-04 NOTE — PLAN OF CARE
Patient received to SSCU room 7B accompanied by friend. Patient aaox4. Vss. No complaints. Pre procedure admission completed. Orders on chart carried out. PIV started. Plan of care reviewed with patient and family. All questions answered. Call light placed within reach.

## 2025-04-05 ENCOUNTER — TELEPHONE (OUTPATIENT)
Dept: CARDIOLOGY | Facility: CLINIC | Age: 55
End: 2025-04-05
Payer: COMMERCIAL

## 2025-04-05 DIAGNOSIS — I63.411 CEREBROVASCULAR ACCIDENT (CVA) DUE TO EMBOLISM OF RIGHT MIDDLE CEREBRAL ARTERY: Primary | ICD-10-CM

## 2025-04-05 DIAGNOSIS — Q21.12 PFO (PATENT FORAMEN OVALE): ICD-10-CM

## 2025-04-05 PROBLEM — I63.511 ACUTE CEREBROVASCULAR ACCIDENT (CVA) DUE TO OCCLUSION OF RIGHT MIDDLE CEREBRAL ARTERY: Status: ACTIVE | Noted: 2025-03-27

## 2025-04-05 PROBLEM — I10 HYPERTENSION: Status: ACTIVE | Noted: 2025-03-28

## 2025-04-05 PROBLEM — E78.2 MIXED HYPERLIPIDEMIA: Status: ACTIVE | Noted: 2025-03-28

## 2025-04-05 PROBLEM — E78.5 HYPERLIPIDEMIA: Status: ACTIVE | Noted: 2025-03-28

## 2025-04-05 LAB — BSA FOR ECHO PROCEDURE: 2.3 M2

## 2025-04-05 NOTE — TELEPHONE ENCOUNTER
Spoke w patient at length by phone. He had a right MCA stroke and has a positive bubble study at [facility]. He is a  but makes local deliveries. He does not travel out of state. He is also fairly active and runs regularly. There is no prior personal or family history of venous thromboembolism. I have also reviewed his records from [facility] in Epic.  JAYLON revealed a very small number of bubbles crossing the atrial septum. This was noted on relooking at the images before the final report was placed. This is likely because we were unable to increase right atrial pressure above the left due to sedation. I discontinued sedation entirely in an attempt to allow the  patient to wake up and be able to either cough or perform a Valsalva, but he did not wake up adequately to do either.  It am quite certain that he has a PFO, and not a sinus of Valsalva ASD or pulmonary AVM. Therefore I do not believe a CT or MRI is necessary. Recommend proceeding w percutaneous closure of the PFO.  My additional recommendations are as follows:  Complete work up to r/o a hypercoagulable state.  Refer to Hematology for the same reason either e-consult or in-person.  Switch dual antiplatelet therapy to anticoagulant. (I understand that this is the most controversial recommendation and understandably he wishes to discuss this w his Neurologist at [facility]).  30 day event monitor.  Age appropriate cancer screen (will defer to his PCP).      Cerebrovascular accident (CVA) due to embolism of right middle cerebral artery  -     Factor V (Leiden) Mutation; Future; Expected date: 04/05/2025  -     Prothrombin [id number] Mutation; Future; Expected date: 04/05/2025  -     Cardiac event monitor; Future  -     Protein S Antigen, Total; Future; Expected date: 04/05/2025  -     Protein C Antigen, Total; Future; Expected date: 04/05/2025  -     E-Consult to [facility]    PFO (patent foramen ovale)  -     Factor V (Leiden) Mutation; Future; Expected date: 04/05/2025  -      Prothrombin [id number] Mutation; Future; Expected date: 04/05/2025  -     Cardiac event monitor; Future  -     Protein S Antigen, Total; Future; Expected date: 04/05/2025  -     Protein C Antigen, Total; Future; Expected date: 04/05/2025

## 2025-04-07 ENCOUNTER — APPOINTMENT (OUTPATIENT)
Dept: LAB | Facility: HOSPITAL | Age: 55
End: 2025-04-07
Attending: INTERNAL MEDICINE
Payer: COMMERCIAL

## 2025-04-07 ENCOUNTER — CLINICAL SUPPORT (OUTPATIENT)
Dept: CARDIOLOGY | Facility: HOSPITAL | Age: 55
End: 2025-04-07
Attending: INTERNAL MEDICINE
Payer: COMMERCIAL

## 2025-04-07 ENCOUNTER — TELEPHONE (OUTPATIENT)
Dept: CARDIOLOGY | Facility: CLINIC | Age: 55
End: 2025-04-07
Payer: COMMERCIAL

## 2025-04-07 ENCOUNTER — TELEPHONE (OUTPATIENT)
Dept: CARDIOLOGY | Facility: HOSPITAL | Age: 55
End: 2025-04-07
Payer: COMMERCIAL

## 2025-04-07 ENCOUNTER — E-CONSULT (OUTPATIENT)
Dept: HEMATOLOGY/ONCOLOGY | Facility: CLINIC | Age: 55
End: 2025-04-07
Payer: COMMERCIAL

## 2025-04-07 ENCOUNTER — OFFICE VISIT (OUTPATIENT)
Dept: CARDIOLOGY | Facility: CLINIC | Age: 55
End: 2025-04-07
Payer: COMMERCIAL

## 2025-04-07 VITALS
DIASTOLIC BLOOD PRESSURE: 96 MMHG | BODY MASS INDEX: 25.33 KG/M2 | SYSTOLIC BLOOD PRESSURE: 155 MMHG | OXYGEN SATURATION: 98 % | HEIGHT: 76 IN | WEIGHT: 208 LBS | HEART RATE: 70 BPM

## 2025-04-07 DIAGNOSIS — I63.511 ACUTE CEREBROVASCULAR ACCIDENT (CVA) DUE TO OCCLUSION OF RIGHT MIDDLE CEREBRAL ARTERY: Primary | ICD-10-CM

## 2025-04-07 DIAGNOSIS — Q21.12 PFO (PATENT FORAMEN OVALE): ICD-10-CM

## 2025-04-07 DIAGNOSIS — I63.9 CRYPTOGENIC STROKE: Primary | ICD-10-CM

## 2025-04-07 DIAGNOSIS — Q21.12 PFO (PATENT FORAMEN OVALE): Primary | ICD-10-CM

## 2025-04-07 PROCEDURE — 99999 PR PBB SHADOW E&M-EST. PATIENT-LVL III: CPT | Mod: PBBFAC,,, | Performed by: INTERNAL MEDICINE

## 2025-04-07 PROCEDURE — 99451 NTRPROF PH1/NTRNET/EHR 5/>: CPT | Mod: S$GLB,,, | Performed by: INTERNAL MEDICINE

## 2025-04-07 PROCEDURE — 3079F DIAST BP 80-89 MM HG: CPT | Mod: CPTII,S$GLB,, | Performed by: INTERNAL MEDICINE

## 2025-04-07 PROCEDURE — 99205 OFFICE O/P NEW HI 60 MIN: CPT | Mod: S$GLB,,, | Performed by: INTERNAL MEDICINE

## 2025-04-07 PROCEDURE — 3044F HG A1C LEVEL LT 7.0%: CPT | Mod: CPTII,S$GLB,, | Performed by: INTERNAL MEDICINE

## 2025-04-07 PROCEDURE — 93270 REMOTE 30 DAY ECG REV/REPORT: CPT

## 2025-04-07 PROCEDURE — 3008F BODY MASS INDEX DOCD: CPT | Mod: CPTII,S$GLB,, | Performed by: INTERNAL MEDICINE

## 2025-04-07 PROCEDURE — 3077F SYST BP >= 140 MM HG: CPT | Mod: CPTII,S$GLB,, | Performed by: INTERNAL MEDICINE

## 2025-04-07 NOTE — CONSULTS
[facility] - Hematology 5th Fl  Response for E-Consult     Patient Name: Goldy Irizarry  MRN: [id number]  Primary Care Provider: Sampson Millan MD   Requesting Provider: Larissa Diaz MD  Consults    Recommendation: Thank you for the e-consult. With the labs you have ordered and those from [facility] the thrombophilia testing is complete. No additional tests needed.    Additional future steps to consider: none at this time    Total time of Consultation: 5 minute    I did not speak to the requesting provider verbally about this.     *This eConsult is based on the clinical data available to me and is furnished without benefit of a physical examination. The eConsult will need to be interpreted in light of any clinical issues or changes in patient status not available to me at the time of filing this eConsults. Significant changes in patient condition or level of acuity should result in immediate formal consultation and reevaluation. Please alert me if you have further questions.    Thank you for this eConsult referral.     Adelina Del Real MD  [facility] - Hematology [facility]

## 2025-04-07 NOTE — TELEPHONE ENCOUNTER
Attempting to reach department on patient behalf for cardiac 30 day monitor,  called ex [phone] and ext [phone].  LVM

## 2025-04-07 NOTE — TELEPHONE ENCOUNTER
----- Message from ELENA Asif sent at 4/7/2025 11:09 AM CDT -----  Regarding: referral for cardiac event monitor 30 days  Pt is here at [facility] and would like to have placed today.He has transportation limitations.Please call if you can to have him come up to the clinic and complete today.Laya/Dr [id number]

## 2025-04-08 ENCOUNTER — PATIENT OUTREACH (OUTPATIENT)
Dept: ADMINISTRATIVE | Facility: CLINIC | Age: 55
End: 2025-04-08
Payer: COMMERCIAL

## 2025-04-08 NOTE — PROGRESS NOTES
C3 nurse spoke with Goldy Irizarry for a TCC post hospital discharge follow up call. The patient has a scheduled \[facility]\"" appointment with Sampson Millan MD on 04/15/2025 @ 1600.

## 2025-04-09 PROBLEM — I63.9 CRYPTOGENIC STROKE: Status: ACTIVE | Noted: 2025-04-09

## 2025-04-10 DIAGNOSIS — Q21.12 PFO (PATENT FORAMEN OVALE): Primary | ICD-10-CM

## 2025-04-10 DIAGNOSIS — I63.9 CRYPTOGENIC STROKE: Primary | ICD-10-CM

## 2025-04-10 DIAGNOSIS — I63.511 ACUTE CEREBROVASCULAR ACCIDENT (CVA) DUE TO OCCLUSION OF RIGHT MIDDLE CEREBRAL ARTERY: ICD-10-CM

## 2025-04-10 RX ORDER — DIPHENHYDRAMINE HCL 50 MG
50 CAPSULE ORAL ONCE
OUTPATIENT
Start: 2025-04-10 | End: 2025-04-10

## 2025-04-10 RX ORDER — SODIUM CHLORIDE 0.9 % (FLUSH) 0.9 %
10 SYRINGE (ML) INJECTION
Status: SHIPPED | OUTPATIENT
Start: 2025-04-10

## 2025-04-10 RX ORDER — SODIUM CHLORIDE 9 MG/ML
INJECTION, SOLUTION INTRAVENOUS CONTINUOUS
OUTPATIENT
Start: 2025-04-10 | End: 2025-04-10

## 2025-04-10 NOTE — ASSESSMENT & PLAN NOTE
Patient with cryptogenic stroke and patent foramen ovale (PFO) with high risk features (Large shunt on transthoracic echo with valsalva). Extensive workup including angiogram of the head and neck did not reveal a cause for the cerebrovascular accident. There is evidence (At least 3 different randomized clinical trials) suggesting lower incidence of recurrence of stroke with percutaneous PFO closure when compared to medical therapy only regardless of the atrial fibrillation status. Will plan for PFO closure. Below the references     Nehemiah HOLT et al. N Engl J Med 2017;377:[id number]  Presley Talbot al. N Engl J Med 2017;377:3194-2402  Jerrod CHUA et al. N Engl J Med 2017;377:5733-2631

## 2025-04-10 NOTE — PROGRESS NOTES
INTERVENTIONAL CARDIOLOGY CLINIC  [facility]    REFERRING PHYSICIAN: Dr Willis/Dr Adair    REASON FOR VISIT:  Cryptogenic stroke    HISTORY OF PRESENT ILLNESS  Goldy Irizarry is a 54 y.o. male referred by Dr. Adair for evaluation of cryptogenic stroke.    The patient has no rpevious medical history. Last week he presented with acute right MCA stroke. Workup revealed no intracranial vascular abnormalities and no carotid artery disease. He was found to have a large PFO on transthoracic echo with valsalva. A transesophageal echo ruled out further intracardiac congenital abnormalities. His neurological deficit is improving and he comes today to be considered for PFO closure.    PAST MEDICAL HISTORY  Past Medical History:   Diagnosis Date    Acute cerebrovascular accident (CVA) due to occlusion of right middle cerebral artery 03/27/2025    History of colon polyps 07/10/2024    7-2024: Next in 3 years   - One 5 mm polyp in the transverse colon, removed                          with a cold snare. Resected and retrieved.                          - One 2 mm polyp in the transverse colon, removed                          with a jumbo cold forceps. Resected and retrieved.                          - One 5 mm polyp in the descending colon, removed                          with     Mixed hyperlipidemia 03/28/2025    Primary hypertension 03/28/2025    Vitamin D insufficiency 01/10/2024        PAST SURGICAL HISTORY  Past Surgical History:   Procedure Laterality Date    COLONOSCOPY N/A 7/10/2024    Procedure: COLONOSCOPY;  Surgeon: Travis Arana MD;  Location: [facility] ([address] [place] [address]);  Service: Endoscopy;  Laterality: N/A;  ref by / domonique inst  rescheduled-resent prep/Suflave and mailed instructions-KP  7/2-precall complete-KPvt  7/9-pt moved from extra screening to Dr. Arana-Kpvt    ECHOCARDIOGRAM,TRANSESOPHAGEAL N/A 4/4/2025    Procedure: Transesophageal echo (JAYLON) intra-procedure log documentation;   Sur[facility];  Location: [facility] EP LAB;  Service: Cardiology;  Laterality: N/A;       SOCIAL HISTORY  TOBACCO: Denies    REVIEW OF SYSTEMS  Non contributory, relevant information discussed in HPI    STUDIES  I independently reviewed the following studies and my interpretation is reflected in the plan:  Echo  JAYLON      PHYSICAL EXAM  Physical Exam  Constitutional:       General: He is not in acute distress.  HENT:      Nose: Nose normal.   Cardiovascular:      Rate and Rhythm: Normal rate and regular rhythm.   Pulmonary:      Effort: Pulmonary effort is normal.   Musculoskeletal:      Cervical back: Neck supple.      Left lower leg: No edema.   Skin:     General: Skin is warm.      Capillary Refill: Capillary refill takes less than 2 seconds.   Neurological:      Mental Status: He is alert and oriented to person, place, and time.   Psychiatric:         Mood and Affect: Mood normal.         ASSESSMENT AND PLAN  Cryptogenic stroke  Patient with cryptogenic stroke and patent foramen ovale (PFO) with high risk features (Large shunt on transthoracic echo with valsalva). Extensive workup including angiogram of the head and neck did not reveal a cause for the cerebrovascular accident. There is evidence (At least 3 different randomized clinical trials) suggesting lower incidence of recurrence of stroke with percutaneous PFO closure when compared to medical therapy only regardless of the atrial fibrillation status. Will plan for PFO closure. Below the references     Nehemiah HOLT et al. N Engl J Med 2017;377:6175-1153  Presley RIVAS et al. N Engl J Med 2017;377:3332-7471  Jerrod CHUA et al. N Engl J Med 2017;377:7863-1421    Body mass index is 25.32 kg/m². Healthy lifestyle was discussed with the patient as well as the importance of physical activity to improve cardiovascular health.    The medication list was reviewed with the patient and inactive medications as well as duplicates were removed from the medical  record.    We spent >70 minutes reviewing, examining and evaluating this patient, including reviewing relevant diagnostic studies, and 50% of which were spent on patient counseling including the patient's condition, diagnosis, prognosis, expected recovery after complex transcatheter structural heart intervention. He was consented for PFO closure. Gunner risks and benefits of the procedure were discussed at length.       Garo Worthy MD [facility]  Interventional Cardiology  Structural/Valvular heart disease  [phone]

## 2025-04-11 ENCOUNTER — EDUCATION (OUTPATIENT)
Dept: CARDIOLOGY | Facility: CLINIC | Age: 55
End: 2025-04-11
Payer: COMMERCIAL

## 2025-04-11 DIAGNOSIS — Q21.12 PFO (PATENT FORAMEN OVALE): Primary | ICD-10-CM

## 2025-04-11 RX ORDER — CLOPIDOGREL BISULFATE 75 MG/1
75 TABLET ORAL DAILY
Qty: 90 TABLET | Refills: 3 | Status: SHIPPED | OUTPATIENT
Start: 2025-04-11 | End: 2026-04-11

## 2025-04-11 NOTE — PROGRESS NOTES
OUTPATIENT CATHETERIZATION INSTRUCTIONS    You have been scheduled for a procedure in the catheterization lab on Friday, May 9, 2025.     Please report to the Cardiology Waiting Area on the Third floor of the hospital and check in at 7:00AM.  You will then be taken to the SSCU (Short Stay Cardiac Unit) and prepared for your procedure. Please be aware that this is not the time of your procedure but the time you are to arrive. The procedures are scheduled on an hourly basis; however, emergency cases take precedence over all other cases.       Nothing to eat after MIDNIGHT 12 AM You may have clear liquids until the time of your admission which should be 2 hours prior to your procedure.          You are encouraged to drink at least 16 ounces of clear liquids prior to your admission to SSCU.          Clear liquids include water, black coffee, clear juices, and performance drinks - no pulp or milk.         .    2.   You may take your regular morning medications with water.       3.   Hold the following medications prior to your procedure:NONE               4.   Start taking PLAVIX 75m tablets night before procedure and 1 tablet morning of procedure.          The procedure will take 1-2 hours to perform. During the procedure you will receive IV sedation administered by a nurse.  Most patients will sleep through procedure.  After the procedure, you will either return to SSCU or spend some time in the cath lab recovery room.  If appropriate, your family will be able to stay with you during this time.      You should be discharged home that same day if you are stable and there are no complications.  Your doctor will determine whether you are discharged or kept overnight  based on your particular procedure and time that procedure is completed.   The results of your procedure will be discussed with you before you are discharged.     YOU WILL NOT BE ABLE TO DRIVE YOURSELF HOME.  SOMEONE MUST BE ABLE TO DRIVE YOU HOME FROM  [facility].       If you should have any questions, concerns, or need to change the date of your procedure, please call  ELENA Helm @ [phone].      INSTRUCTIONS WERE GIVEN TO THE PATIENT VERBALLY AND THEY VERBALIZED UNDERSTANDING.  THEY DO NOT REQUIRE ANY SPECIAL NEEDS AND DO NOT HAVE ANY LEARNING BARRIERS.                      Directions for Reporting to Cardiology Waiting Area in the Hospital  If you park in the Parking Garage:  Take elevators to the1st floor of the parking garage.  Continue past the gift shop, coffee shop, and piano.  Take a right and go to the gold elevators. (Elevator B)  Take the elevator to the 3rd floor.  Follow the arrow on the sign on the wall that says Cath Lab Registration/EP Lab Registration.  Follow the long hallway all the way around until you come to a big open area.  This is the registration area.  Check in at Reception Desk.    OR    If family is dropping you off:  Have them drop you off at the front of the Hospital under the green overhang.  Enter through the doors and take a right.  Take the E elevators to the 3rd floor Cardiology Waiting Area.  Check in at the Reception Desk in the waiting room.                Insurance Authorization/Personal Assistance    The [facility] pre-service team will submit information to your insurance carrier for authorization.  Please note that we do not handle any insurance issues in our office.  If there are any financial obligations, such as co-pays, deductibles, or out of pocket fees, you will be contacted by a representative prior to your procedure.  It is the patient's responsibility to assure that their procedure is cleared in advance.  Our expert financial counselors are available to provide  patients with assistance for personalized cost estimates.  Financial counselors can be reached the following ways:    Pre Service Representatives:  [phone]  Financial Counselors:  [phone]  [employer] messaging - accessed via the patient  "portal  Live chat accessed through [email or web address]      FMLA/Disability Forms    ALL FMLA/Disability forms and claims are  processed through the Disability office.  All claims must be initiated through this office.  Once the forms are completed, they will be sent to the physician for signature.  Please allow 10 - 14 working days to have forms completed and returned to you.   You may reach this office in  the following ways:      Email:  [email or web address]  Fax:  [phone]  Phone:  [phone]    Release of Information and Medical Records      Our office will send a copy of any office notes or procedure notes to your referring provider.   ALL other medical records are released through the Medical Records office.  Phone:  [phone]  Fax:  [phone]    Miscellaneous Information    On-line information:  [email or web address]  Parking at [facility] is free and open 24/7  [facility] parking is located on the first floor of the garage and is available for a small fee Monday - Friday from 6a - 6 pm  [facility] located at [facility] campus:  [phone], or [email or web address]  If you need assistance with setting up or troubleshooting "MyChart" please call [phone]                        "

## 2025-04-15 ENCOUNTER — OFFICE VISIT (OUTPATIENT)
Dept: INTERNAL MEDICINE | Facility: CLINIC | Age: 55
End: 2025-04-15
Payer: COMMERCIAL

## 2025-04-15 ENCOUNTER — LAB VISIT (OUTPATIENT)
Dept: LAB | Facility: HOSPITAL | Age: 55
End: 2025-04-15
Attending: INTERNAL MEDICINE
Payer: COMMERCIAL

## 2025-04-15 VITALS — HEIGHT: 76 IN | WEIGHT: 202.81 LBS | OXYGEN SATURATION: 99 % | HEART RATE: 85 BPM | BODY MASS INDEX: 24.7 KG/M2

## 2025-04-15 DIAGNOSIS — Z86.73 HISTORY OF STROKE: ICD-10-CM

## 2025-04-15 DIAGNOSIS — E78.5 DYSLIPIDEMIA: ICD-10-CM

## 2025-04-15 DIAGNOSIS — E55.9 VITAMIN D INSUFFICIENCY: ICD-10-CM

## 2025-04-15 DIAGNOSIS — Z86.0100 HISTORY OF COLON POLYPS: ICD-10-CM

## 2025-04-15 DIAGNOSIS — M79.674 GREAT TOE PAIN, RIGHT: ICD-10-CM

## 2025-04-15 DIAGNOSIS — Q21.12 PFO (PATENT FORAMEN OVALE): Primary | ICD-10-CM

## 2025-04-15 DIAGNOSIS — M10.071 ACUTE IDIOPATHIC GOUT INVOLVING TOE OF RIGHT FOOT: ICD-10-CM

## 2025-04-15 LAB — URATE SERPL-MCNC: 7.3 MG/DL (ref 3.4–7)

## 2025-04-15 PROCEDURE — 84550 ASSAY OF BLOOD/URIC ACID: CPT

## 2025-04-15 PROCEDURE — 99999 PR PBB SHADOW E&M-EST. PATIENT-LVL III: CPT | Mod: PBBFAC,,, | Performed by: INTERNAL MEDICINE

## 2025-04-15 PROCEDURE — 36415 COLL VENOUS BLD VENIPUNCTURE: CPT

## 2025-04-15 RX ORDER — COLCHICINE 0.6 MG/1
0.6 TABLET ORAL 2 TIMES DAILY PRN
Qty: 30 TABLET | Refills: 11 | Status: SHIPPED | OUTPATIENT
Start: 2025-04-15

## 2025-04-15 RX ORDER — ATORVASTATIN CALCIUM 40 MG/1
40 TABLET, FILM COATED ORAL DAILY
Qty: 90 TABLET | Refills: 3 | Status: SHIPPED | OUTPATIENT
Start: 2025-04-15 | End: 2026-04-15

## 2025-04-15 NOTE — PROGRESS NOTES
INTERNAL MEDICINE CLINIC  Follow-up Visit Progress Note     PRESENTING HISTORY     PCP: Sampson Millan MD    Last Clinic Visit with me:  Annual 3-    Current Chief Complaint/Problem:    Chief Complaint   Patient presents with    Foot Pain      History of Present Illness & ROS: Mr. Goldy Irizarry is a 54 y.o. male.    No residual symptoms post recent acute stroke.  Found to have PFO.    No chest pain or SOB.    Also right toe pain for a while.    PAST HISTORY:     Past Medical History:   Diagnosis Date    Acute cerebrovascular accident (CVA) due to occlusion of right middle cerebral artery 03/27/2025    History of colon polyps 07/10/2024    7-2024: Next in 3 years   - One 5 mm polyp in the transverse colon, removed                          with a cold snare. Resected and retrieved.                          - One 2 mm polyp in the transverse colon, removed                          with a jumbo cold forceps. Resected and retrieved.                          - One 5 mm polyp in the descending colon, removed                          with     Mixed hyperlipidemia 03/28/2025    PFO (patent foramen ovale) 04/05/2025 4-2025  PFO, and not a sinus of Valsalva ASD or pulmonary AVM. Recommend proceeding w percutaneous closure of the PFO.  My additional recommendations are as follows:      Complete work up to r/o a hypercoagulable state.    Refer to Hematology for the same reason either e-consult or in-person.    Switch dual antiplatelet therapy to anticoagulant. (I understand that this is the most controversial re    Primary hypertension 03/28/2025    Vitamin D insufficiency 01/10/2024       Past Surgical History:   Procedure Laterality Date    COLONOSCOPY N/A 7/10/2024    Procedure: COLONOSCOPY;  Surgeon: Travis Arana MD;  Location: [facility] ([address] [place] [address]);  Service: Endoscopy;  Laterality: N/A;  ref by / domonique inst  rescheduled-resent prep/Suflave and mailed instructions-  7/2-precall  complete-Citlalli  7/9-pt moved from extra screening to [facility]. [facility]ana[facility] [place]    ECHOCARDIOGRAM,TRANSESOPHAGEAL N/A 4/4/2025    Procedure: Transesophageal echo (JAYLON) intra-procedure log documentation;  Surgeon: Provider, Raul Diagnostic;  Location: [facility] EP LAB;  Service: Cardiology;  Laterality: N/A;       Family History   Problem Relation Name Age of Onset    Diabetes Mother      Hypertension Mother      No Known Problems Father      Colon cancer Neg Hx      Colon polyps Neg Hx         Social History     Socioeconomic History    Marital status: Single    Number of children: 1   Occupational History    Occupation: commercial ramo   Tobacco Use    Smoking status: Never    Smokeless tobacco: Never   Substance and Sexual Activity    Alcohol use: No     Alcohol/week: 0.0 standard drinks of alcohol    Drug use: No    Sexual activity: Not Currently     Partners: Female   Social History Narrative    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    Single    One girl 27 (as of 2023).- In [place].  Sib-in law is Cardiology.        Pescatarian         Social Drivers of Health     Food Insecurity: No Food Insecurity (3/28/2025)    Received from [facility]    Hunger Vital Sign     Worried About Running Out of Food in the Last Year: Never true     Ran Out of Food in the Last Year: Never true   Transportation Needs: No Transportation Needs (3/28/2025)    Received from [facility]    PRAPARE - Transportation     Lack of Transportation (Medical): No     Lack of Transportation (Non-Medical): No   Housing Stability: Low Risk  (3/28/2025)    Received from [facility]    Housing Stability Vital Sign     Unable to Pay for Housing in the Last Year: No     Number of Times Moved in the Last Year: 0     Homeless in the Last Year: No       MEDICATIONS & ALLERGIES:     Medications Ordered Prior to Encounter[1]     Review of patient's allergies indicates:  No Known Allergies    Medications Reconciliation:   I have reconciled the patient's home  medications and discharge medications with the patient/family. I have updated all changes.  Refer to After-Visit Medication List.    OBJECTIVE:     Vital Signs:  Vitals:    04/15/25 1601   BP: (P) 128/84   Pulse: 85     Wt Readings from Last 3 Encounters:   04/15/25 1601 92 kg (202 lb 13.2 oz)   04/07/25 1006 94.3 kg (208 lb)   04/07/25 1005 93.4 kg (205 lb 14.6 oz)   04/04/25 1146 98.9 kg (218 lb)     Body mass index is 24.69 kg/m².     Physical Exam:  General:  No distress.   HEENT: Head is normocephalic, atraumatic;  Eyes: Clear conjunctiva.  Neck: Supple, symmetrical neck; trachea midline.  Lungs: Clear to auscultation bilaterally and normal respiratory effort.  Cardiovascular: Heart with regular rate and rhythm.    Extremities: No LE edema.    Abdomen: Abdomen is soft, non-tender non-distended with normal bowel sounds.  Skin: Skin color, texture, turgor normal. No rashes.  Musculoskeletal: Normal gait.   Neurologic: Normal strength and tone. No focal numbness or weakness.   Psychiatric: Normal affect. Alert.    Right toe slightly swollen and tender on palpation.    Laboratory  Lab Results   Component Value Date    WBC 3.77 (L) 03/12/2025    HGB 13.5 (L) 03/12/2025    HCT 42.6 03/12/2025     03/12/2025    CHOL 172 03/27/2025    TRIG 86 03/27/2025    HDL 36 (L) 03/27/2025    ALT 17 03/27/2025    AST 27 03/27/2025     03/29/2025    K 4.1 03/29/2025     03/12/2025    CREATININE 1.25 03/29/2025    BUN 19.1 03/29/2025    CO2 28 03/29/2025    TSH 1.407 03/12/2025    PSA 2.5 03/12/2025    INR 1.1 03/28/2025    HGBA1C 4.9 03/27/2025     ASSESSMENT & PLAN:     History of recent stroke  PFO  4-2025  PFO, and not a sinus of Valsalva ASD or pulmonary AVM. Recommend proceeding w percutaneous closure of the PFO.  My additional recommendations are as follows:  Complete work up to r/o a hypercoagulable state.  Refer to Hematology for the same reason either e-consult or in-person.  Switch dual antiplatelet  therapy to anticoagulant. (I understand that this is the most controversial recommendation and understandably he wishes to discuss this w his Neurologist at [facility]).  30 day event monitor.  Age appropriate cancer screen (will defer to his PCP).    Plan: PFO closure 5-9-2025     On: Aspirin 81 mg daily          Plavix daily.    Dyslipidemia  -     atorvastatin (LIPITOR) 40 MG tablet; Take 1 tablet (40 mg total) by mouth once daily.  Dispense: 90 tablet; Refill: 3    Vitamin D insufficiency  -     cholecalciferol, vitamin D3, 125 mcg (5,000 unit) Tab; Take 1 tablet (5,000 Units total) by mouth once daily.     History of Colon Polyps  7-2024: Next in 3 years   - One 5 mm polyp in the transverse colon, removed                          with a cold snare. Resected and retrieved.                          - One 2 mm polyp in the transverse colon, removed                          with a jumbo cold forceps. Resected and retrieved.                          - One 5 mm polyp in the descending colon, removed                          with a cold snare. Resected and retrieved     Acute gout right toe  - Uric acid high.        Rx  colchicine BID PRN.              Allopurinol 100 mg daily.    Preventive Health Maintenance:     Up to date     Return to Clinic for Follow Up with me:   1 year.    Scheduled Follow-up :  Future Appointments   Date Time Provider Department Center   4/15/2025  4:30 PM LAB, APPOINTMENT [facility]  Brianna Ludwig PCW   5/2/2025 11:00 AM LAB, APPOINTMENT [place] [facility]   5/9/2025  7:00 AM [facility]       After Visit Medication List :     Medication List            Accurate as of April 15, 2025  4:23 PM. If you have any questions, ask your nurse or doctor.                START taking these medications      colchicine 0.6 mg tablet  Commonly known as: COLCRYS  Take 1 tablet (0.6 mg total) by mouth 2 (two) times daily as needed (gout).  Started by: Sampson Millan,  MD            CHANGE how you take these medications      atorvastatin 40 MG tablet  Commonly known as: LIPITOR  Take 1 tablet (40 mg total) by mouth once daily.  What changed:   medication strength  how much to take  when to take this  Changed by: Sampson Millan MD            CONTINUE taking these medications      aspirin 81 MG Chew     cholecalciferol (vitamin D3) 125 mcg (5,000 unit) Tab  Take 1 tablet (5,000 Units total) by mouth once daily.     clopidogreL 75 mg tablet  Commonly known as: PLAVIX  Take 1 tablet (75 mg total) by mouth once daily.               Where to Get Your Medications        These medications were sent to [id number] [place] [address] [address] AT [facility] [place] [address] [address] [facility] [address]      Phone: [phone]   atorvastatin 40 MG tablet  colchicine 0.6 mg tablet         Signing Physician:  Sampson Millan MD         [1]   Current Outpatient Medications on File Prior to Visit   Medication Sig Dispense Refill    aspirin 81 MG Chew Take 1 tablet by mouth once daily.      cholecalciferol, vitamin D3, 125 mcg (5,000 unit) Tab Take 1 tablet (5,000 Units total) by mouth once daily.      clopidogreL (PLAVIX) 75 mg tablet Take 1 tablet (75 mg total) by mouth once daily. 90 tablet 3    [DISCONTINUED] atorvastatin (LIPITOR) 80 MG tablet Take 80 mg by mouth.       Current Facility-Administered Medications on File Prior to Visit   Medication Dose Route Frequency Provider Last Rate Last Admin    [DISCONTINUED] GENERIC EXTERNAL MEDICATION     Provider, Generic External Data        [DISCONTINUED] sodium chloride 0.9% flush 10 mL  10 mL Intravenous PRN Garo Jean Baptiste MD

## 2025-04-16 RX ORDER — ALLOPURINOL 100 MG/1
100 TABLET ORAL DAILY
Qty: 90 TABLET | Refills: 3 | Status: SHIPPED | OUTPATIENT
Start: 2025-04-16

## 2025-04-28 ENCOUNTER — TELEPHONE (OUTPATIENT)
Dept: CARDIOLOGY | Facility: CLINIC | Age: 55
End: 2025-04-28
Payer: COMMERCIAL

## 2025-04-28 NOTE — TELEPHONE ENCOUNTER
Returned call.  No answer  left patient message        ----- Message from Janett sent at 4/25/2025  1:48 PM CDT -----  Regarding: return call  Pt is returning your call and can be reached at [phone].Thank you

## 2025-04-29 DIAGNOSIS — I63.9 ACUTE ISCHEMIC STROKE: Primary | ICD-10-CM

## 2025-05-01 ENCOUNTER — DOCUMENTATION ONLY (OUTPATIENT)
Dept: CARDIAC CATH/INVASIVE PROCEDURES | Facility: HOSPITAL | Age: 55
End: 2025-05-01
Payer: COMMERCIAL

## 2025-05-01 NOTE — PROGRESS NOTES
Interventional Cardiology    Patient seen by me on 4.9.2025 referred for cryptogenic stroke and patent foramen ovale.    The patient presented with a CVA on 3.27.2025 requiring mechanical thrombectomy. His CTA showed right MCA occlusion (M1) and non significant ipsilateral carotid disease.     An extensive workup showed no DVT, no atrial fibrillation, and no other obvious causes for the stoke. A trans thoracic echocardiogram showed right to left shunting with valsalva. My review of the echo images revealed total opacification of the left sided chambers with bubbles after 1 beat with valsalva maneuver. Suggesting a very large PFO.     A JAYLON was performed to assess the anatomy of the inter atrial septum and exclude associated abnormalities. He was found to have an atrial septal aneurysm > 15 mm and a bubble study was positive at rest. No valsalva was performed as the patient was performed as the JAYLON was done under general anesthesia.    A 30 day event monitor did not show any atrial fibrillation.    He was evaluated by Cardiology and Neurology at outside institution both whom recommended PFO closure.    I evaluated the patient, whose neurological deficit is almost resolved. We discussed his therapeutic options and considering he had an image proven stroke requiring thrombectomy, no neck or intracerebral vascular disease, no atrial fibrillation and a PFO with high risk features (large size and atrial septal aneurysm >15 mm) I recommended percutaneous PFO closure for cryptogenic stroke.    Patient was initially denied by insurance for lack of documentation. Therefore AI am writing this summary letter to complement my clinic visit note and I am attaching reports from echo, cardiology and neurology visits as well as cerebral imaging during the stroke admission for reconsideration by the insurer.      Garo Worthy MD [facility]  Interventional Cardiology  Structural/Valvular heart disease  [phone]

## 2025-05-01 NOTE — H&P (VIEW-ONLY)
Interventional Cardiology    Patient seen by me on 4.9.2025 referred for cryptogenic stroke and patent foramen ovale.    The patient presented with a CVA on 3.27.2025 requiring mechanical thrombectomy. His CTA showed right MCA occlusion (M1) and non significant ipsilateral carotid disease.     An extensive workup showed no DVT, no atrial fibrillation, and no other obvious causes for the stoke. A trans thoracic echocardiogram showed right to left shunting with valsalva. My review of the echo images revealed total opacification of the left sided chambers with bubbles after 1 beat with valsalva maneuver. Suggesting a very large PFO.     A JAYLON was performed to assess the anatomy of the inter atrial septum and exclude associated abnormalities. He was found to have an atrial septal aneurysm > 15 mm and a bubble study was positive at rest. No valsalva was performed as the patient was performed as the JAYLON was done under general anesthesia.    A 30 day event monitor did not show any atrial fibrillation.    He was evaluated by Cardiology and Neurology at outside institution both whom recommended PFO closure.    I evaluated the patient, whose neurological deficit is almost resolved. We discussed his therapeutic options and considering he had an image proven stroke requiring thrombectomy, no neck or intracerebral vascular disease, no atrial fibrillation and a PFO with high risk features (large size and atrial septal aneurysm >15 mm) I recommended percutaneous PFO closure for cryptogenic stroke.    Patient was initially denied by insurance for lack of documentation. Therefore AI am writing this summary letter to complement my clinic visit note and I am attaching reports from echo, cardiology and neurology visits as well as cerebral imaging during the stroke admission for reconsideration by the insurer.      Garo Worthy MD [facility]  Interventional Cardiology  Structural/Valvular heart disease  [phone]

## 2025-05-02 ENCOUNTER — TELEPHONE (OUTPATIENT)
Facility: CLINIC | Age: 55
End: 2025-05-02
Payer: COMMERCIAL

## 2025-05-02 ENCOUNTER — CLINICAL SUPPORT (OUTPATIENT)
Dept: REHABILITATION | Facility: HOSPITAL | Age: 55
End: 2025-05-02
Attending: NURSE PRACTITIONER
Payer: COMMERCIAL

## 2025-05-02 DIAGNOSIS — I63.9 ACUTE ISCHEMIC STROKE: Primary | ICD-10-CM

## 2025-05-02 DIAGNOSIS — I63.511 ACUTE CEREBROVASCULAR ACCIDENT (CVA) DUE TO OCCLUSION OF RIGHT MIDDLE CEREBRAL ARTERY: ICD-10-CM

## 2025-05-02 PROCEDURE — 92522 EVALUATE SPEECH PRODUCTION: CPT | Mod: PO | Performed by: SPEECH-LANGUAGE PATHOLOGIST

## 2025-05-02 NOTE — PROGRESS NOTES
Outpatient Rehab  Speech-Language Pathology Evaluation (only)    Patient Name: Goldy Irizarry  MRN: [id number]  YOB: 1970  Encounter Date: 5/2/2025    Therapy Diagnosis:   Encounter Diagnoses   Name Primary?    Acute ischemic stroke Yes    Acute cerebrovascular accident (CVA) due to occlusion of right middle cerebral artery      Physician: Sandy Delgado FNP    Physician Orders: Eval and Treat  Medical Diagnosis: Acute ischemic stroke    Visit # / Visits Authorized: 1 / 1   Insurance Authorization Period: 4/29/2025 to 4/29/2026  Date of Evaluation: 5/2/2025      Time In: 1335   Time Out: 1430  Total Time: 55   Total Billable Time: 55 minutes     Intake Outcome Measure for FOTO Survey    Therapist reviewed FOTO scores for Goldy Irizarry on 5/2/2025.   FOTO report - see Media section or FOTO account episode details.     Intake Score: 23%    Precautions: Standard    Subjective   History of Present Illness    Prior Level of Function: No concerns prior to cerebrovascular accident - living independently  Current Level of Function: speech skills are within functional limits - 100% intelligibility.    Date of Onset: CVA on 3.27.2025 requiring mechanical thrombectomy. CTA showed right MCA occlusion    History of Current Condition:  Goldy Irizarry is a 54 y.o. male who presents to [facility] for evaluation secondary to CVA. Patient was referred to therapy by Sandy Delgado FNP. Patient reports difficulty with speech clarity/stumbling on his words. Endorses that he is having a lot of anxiety as he is going through a lot right now. Endorses that he had a previous left facial droop that has resolved.  Denies cognitive concerns, attention concerns, memory concerns. Patient attended evaluation alone. Was assessed by Physical therapy / occupational therapy but was not picked up.     Pain: No Pain Reported      Treatment History       No Previously Received  Treatments  Not Currently Receiving Treatments    Living Arrangements  Housing: Home independently with Family members - slowly returning to living alone.      Employment Status: On leave.  Patient is not currently working - previously worked in chemical plant working in internal movement. Is hoping to return to work to work soon.     Past Medical History/Physical Systems Review:   Goldy Irizarry  has a past medical history of Acute cerebrovascular accident (CVA) due to occlusion of right middle cerebral artery, Dyslipidemia, History of colon polyps, PFO (patent foramen ovale), and Vitamin D insufficiency.    Goldy Irizarry  has a past surgical history that includes Colonoscopy (N/A, 7/10/2024) and echocardiogram,transesophageal (N/A, 4/4/2025).    Goldy has a current medication list which includes the following prescription(s): allopurinol, aspirin, atorvastatin, cholecalciferol (vitamin d3), clopidogrel, and colchicine.    Review of patient's allergies indicates:  No Known Allergies     Objective          Formal Assessment:   Cranial Nerve Examination  Cranial Nerve 5: Trigeminal Nerve  Motor Jaw Posture at rest: Closed  Mandible Elevation/Depression: WFL  Mandible lateralization: WFL  Abnormal movement: absent Interpretation: within functional limits    Sensory Forehead: WFL  Cheek: WFL  Jaw: WFL  Facial Pain: None noted Interpretation: within functional limits      Cranial Nerve 7: Facial Nerve  Motor Facial Symmetry: WNL  Wrinkle Forehead: WFL  Close eyes tightly: WFL  Labial Protrusion: WFL  Labial Retraction: WFL  Buccal Strength with Labial Seal: WFL  Abnormal movement: absent Interpretation: within functional limits    Sensory Formal testing not completed. Pt denied any changes in taste      Cranial Nerves IX and X: Glossopharyngeal and Vagus Nerves  Motor Palatal Symmetry (Rest): WNL  Palatal Symmetry (Movement): WNL  Cough: Perceptually strong  Voice Prior to PO intake: Clear  Resonance:  Normal  Abnormal movement: absent Interpretation: within functional limits      Cranial Nerve XII: Hypoglossal Nerve  Motor Tongue at rest: WNL  Lingual Protrusion: WNL  Lingual Protrusion against Resistance: WNL  Lingual Lateralization: WNL  Abnormal movement: absent Interpretation: within functional limits      Other information:  Mucosal Quality: No abnormal findings  Secretion Management: No deficits observed   Dentition: Good condition for speech and mastication    Respiration / Phonation:   # of reps/ 5s Norms/ # reps per second Maximum Phon. Time (ah) Words Per Minute:   P^ 27  5.0-7.1 5.4  Trial 1: 12 124  #words/minute   T^ 22  4.8-7.1 4.4  Trial 2: 12 >125 = WFL    K^ 27  4.4-7.4 5.4   <125 = refer for AAC eval   P^T^K^ 10  3.6-7.5 2.0  Av.5       Norm: 160-170  (paragraph reading)       Norm (F 10-26, M 13-34.6) Norm: 150 to 250 (norm conversation)      Phonation Oral Reading Conversation   Stimulus Sustained ah:    Singing up scale:    Counting 1 to highest # on one breath: The [place] Passage    History and Conversation   Quality clear clear clear   Duration  12.5 seconds  60 seconds  N/a   Loudness  WFL WFL WFL   Steadiness WFL WFL WFL     Overall Speech Intelligibility: good - 100% intelligibility     Speech Intelligibility:   The Frenchay Dysarthria Assessment-2nd Edition was administered to Goldy  to assess his motor speech skills. This test assesses the patient's reflexes, respiration, lips, palate, laryngeal function, tongue, and overall intelligibility. Results are described in the following tables:    Reflexes Respiration Lips Palate Laryngeal Tongue Intelligibility   Normal for Age X x x x  x x   Mild Abnormality     x     Abnormality obvious but can perform task with reasonable approximation          Some production of task poor in quality, unable to sustain, or extremely labored          Unable to undertake task/ movement/ sound            Description: Patient presents with 100%  speech intelligibility - no concerns for dysarthria. Occasional imprecision noted at times when patient utilized fast rate of speech, however speech intelligibility was not impacted.      Awareness / Strategy Use:   Pt demonstrates adequate awareness of motor speech skills. Strategies introduced included:    Speak Slowly / Pacing: Try to slow your rate of speech when talking while keeping the same intonation or sing-song quality that is natural to your voice.   Overarticulation: Over-say all of the sounds in your words.  Speak Loudly: make sure to project your voice so that others can hear you.  Deep Breath: Make sure to use deep breathing to support your speech. If you do not have enough breath support, it is difficult to over-articulate, speak loudly, or speak slowly.   Open Your Mouth: Make sure to open your mouth widely while speaking.     Impact of Motor Speech Impairment on Functioning:   Safety Risks: No safety concerns identified related to patient's ability to communicate an emergency situation to emergency operators via phone or in person.    Time Entry(in minutes):  Speech Sound Production Eval Time Entry: 55    Assessment & Plan   Assessment   Diagnosis and Impressions: Goldy presents to [facility] status post medical diagnosis of Acute ischemic stroke. Patient presents with 100% speech intelligibility - no concerns for dysarthria. Occasional imprecision noted at times when patient utilized fast rate of speech, however speech intelligibility was not impacted. No safety concerns identified related to patient's ability to communicate an emergency situation to emergency operators via phone or in person. Patient educated regarding motor speech strategies and provided with home exercise program. Skilled speech therapy services are not warranted at this time.     Personal Factors Affecting Prognosis: Motivation          Patient Goal for Therapy (SLP): Assess speech clarity  Prognosis: Good        Education  Education was done with Patient. The patient's learning style includes Listening. The patient Verbalizes understanding.   Discussed role of Speech Therapy, recommendations, motor speech strategies, home exercise program       Plan: From a speech language pathology perspective, the patient would not benefit from: Skilled Rehab Services         Patient's spiritual, cultural, and educational needs considered and patient agreeable to plan of care and goals.      LIZANDRO Vaughn, L-SLP, CCC-SLP  Speech Language Pathologist   5/2/2025

## 2025-05-02 NOTE — TELEPHONE ENCOUNTER
Patient came into the office seeking appointment for stroke hos follow up. Patient d/c a month ago from [facility] and transition to [facility] for care. Appointment scheduled per request with MALLORY Capellan

## 2025-05-02 NOTE — LETTER
May 2, 2025  JUAN CARLOS Recinos  [address] [place] [address]  [place] [address]    To whom it may concern,     The attached plan of care is being sent to you for review and reference.    You may indicate your approval by signing the document electronically, or by faxing/mailing a signed copy of the final page of this document back to the attention of MARLON Vaughn, CCC-SLP:         Plan of Care 5/2/25   Effective from: 5/2/2025  Effective to: 5/2/2025    Active  Plan ID: [id number]           Participants as of 5/2/2025    Name Type Comments Contact Info    JUAN CARLOS Recinos Referring Provider  [phone]    MARLON Vaughn, CCC-SLP Speech Language Pathologist  [phone]      Last Plan Note     Author: Gladis Pratt L-SLP, CCC-SLP Status: Sign when Signing Visit Last edited: 5/2/2025  1:00 PM       Outpatient Rehab  Speech-Language Pathology Evaluation (only)    Patient Name: Goldy Irizarry  MRN: [id number]  YOB: 1970  Encounter Date: 5/2/2025    Therapy Diagnosis:   Encounter Diagnoses   Name Primary?    Acute ischemic stroke Yes    Acute cerebrovascular accident (CVA) due to occlusion of right middle cerebral artery      Physician: Sandy Delgado FNP    Physician Orders: Eval and Treat  Medical Diagnosis: Acute ischemic stroke    Visit # / Visits Authorized: 1 / 1   Insurance Authorization Period: 4/29/2025 to 4/29/2026  Date of Evaluation: 5/2/2025      Time In: 1335   Time Out: 1430  Total Time: 55   Total Billable Time: 55 minutes     Intake Outcome Measure for FOTO Survey    Therapist reviewed FOTO scores for Goldy Irizarry on 5/2/2025.   FOTO report - see Media section or FOTO account episode details.     Intake Score: 23%    Precautions: Standard    Subjective   History of Present Illness    Prior Level of Function: No concerns prior to cerebrovascular accident - living independently  Current Level of Function: speech skills are within functional limits - 100%  intelligibility.    Date of Onset: CVA on 3.27.2025 requiring mechanical thrombectomy. CTA showed right MCA occlusion    History of Current Condition:  Goldy Irizarry is a 54 y.o. male who presents to [facility] for evaluation secondary to CVA. Patient was referred to therapy by Sandy Delgado FNP. Patient reports difficulty with speech clarity/stumbling on his words. Endorses that he is having a lot of anxiety as he is going through a lot right now. Endorses that he had a previous left facial droop that has resolved.  Denies cognitive concerns, attention concerns, memory concerns. Patient attended evaluation alone. Was assessed by Physical therapy / occupational therapy but was not picked up.     Pain: No Pain Reported      Treatment History       No Previously Received Treatments  Not Currently Receiving Treatments    Living Arrangements  Housing: Home independently with Family members - slowly returning to living alone.      Employment Status: On leave.  Patient is not currently working - previously worked in chemical plant working in internal movement. Is hoping to return to work to work soon.     Past Medical History/Physical Systems Review:   Goldy Irizarry  has a past medical history of Acute cerebrovascular accident (CVA) due to occlusion of right middle cerebral artery, Dyslipidemia, History of colon polyps, PFO (patent foramen ovale), and Vitamin D insufficiency.    Goldy Irizarry  has a past surgical history that includes Colonoscopy (N/A, 7/10/2024) and echocardiogram,transesophageal (N/A, 4/4/2025).    Goldy has a current medication list which includes the following prescription(s): allopurinol, aspirin, atorvastatin, cholecalciferol (vitamin d3), clopidogrel, and colchicine.    Review of patient's allergies indicates:  No Known Allergies     Objective          Formal Assessment:   Cranial Nerve Examination  Cranial Nerve 5: Trigeminal Nerve  Motor  Jaw Posture at rest: Closed  Mandible Elevation/Depression: WFL  Mandible lateralization: WFL  Abnormal movement: absent Interpretation: within functional limits    Sensory Forehead: WFL  Cheek: WFL  Jaw: WFL  Facial Pain: None noted Interpretation: within functional limits      Cranial Nerve 7: Facial Nerve  Motor Facial Symmetry: WNL  Wrinkle Forehead: WFL  Close eyes tightly: WFL  Labial Protrusion: WFL  Labial Retraction: WFL  Buccal Strength with Labial Seal: WFL  Abnormal movement: absent Interpretation: within functional limits    Sensory Formal testing not completed. Pt denied any changes in taste      Cranial Nerves IX and X: Glossopharyngeal and Vagus Nerves  Motor Palatal Symmetry (Rest): WNL  Palatal Symmetry (Movement): WNL  Cough: Perceptually strong  Voice Prior to PO intake: Clear  Resonance: Normal  Abnormal movement: absent Interpretation: within functional limits      Cranial Nerve XII: Hypoglossal Nerve  Motor Tongue at rest: WNL  Lingual Protrusion: WNL  Lingual Protrusion against Resistance: WNL  Lingual Lateralization: WNL  Abnormal movement: absent Interpretation: within functional limits      Other information:  Mucosal Quality: No abnormal findings  Secretion Management: No deficits observed   Dentition: Good condition for speech and mastication    Respiration / Phonation:   # of reps/ 5s Norms/ # reps per second Maximum Phon. Time (ah) Words Per Minute:   P^ 27  5.0-7.1 5.4  Trial 1: 12 124  #words/minute   T^ 22  4.8-7.1 4.4  Trial 2: 12 >125 = WFL    K^ 27  4.4-7.4 5.4   <125 = refer for AAC eval   P^T^K^ 10  3.6-7.5 2.0  Av.5       Norm: 160-170  (paragraph reading)       Norm (F 10-26, M 13-34.6) Norm: 150 to 250 (norm conversation)      Phonation Oral Reading Conversation   Stimulus Sustained ah:    Singing up scale:    Counting 1 to highest # on one breath: The [place] Passage    History and Conversation   Quality clear clear clear   Duration  12.5 seconds  60 seconds  N/a    Loudness  WFL WFL WFL   Steadiness WFL WFL WFL     Overall Speech Intelligibility: good - 100% intelligibility     Speech Intelligibility:   The Frenchay Dysarthria Assessment-2nd Edition was administered to Goldy  to assess his motor speech skills. This test assesses the patient's reflexes, respiration, lips, palate, laryngeal function, tongue, and overall intelligibility. Results are described in the following tables:    Reflexes Respiration Lips Palate Laryngeal Tongue Intelligibility   Normal for Age X x x x  x x   Mild Abnormality     x     Abnormality obvious but can perform task with reasonable approximation          Some production of task poor in quality, unable to sustain, or extremely labored          Unable to undertake task/ movement/ sound            Description: Patient presents with 100% speech intelligibility - no concerns for dysarthria. Occasional imprecision noted at times when patient utilized fast rate of speech, however speech intelligibility was not impacted.      Awareness / Strategy Use:   Pt demonstrates adequate awareness of motor speech skills. Strategies introduced included:    Speak Slowly / Pacing: Try to slow your rate of speech when talking while keeping the same intonation or sing-song quality that is natural to your voice.   Overarticulation: Over-say all of the sounds in your words.  Speak Loudly: make sure to project your voice so that others can hear you.  Deep Breath: Make sure to use deep breathing to support your speech. If you do not have enough breath support, it is difficult to over-articulate, speak loudly, or speak slowly.   Open Your Mouth: Make sure to open your mouth widely while speaking.     Impact of Motor Speech Impairment on Functioning:   Safety Risks: No safety concerns identified related to patient's ability to communicate an emergency situation to emergency operators via phone or in person.    Time Entry(in minutes):  Speech Sound Production Eval Time  Entry: 55    Assessment & Plan   Assessment   Diagnosis and Impressions: Goldy presents to [facility] status post medical diagnosis of Acute ischemic stroke. Patient presents with 100% speech intelligibility - no concerns for dysarthria. Occasional imprecision noted at times when patient utilized fast rate of speech, however speech intelligibility was not impacted. No safety concerns identified related to patient's ability to communicate an emergency situation to emergency operators via phone or in person. Patient educated regarding motor speech strategies and provided with home exercise program. Skilled speech therapy services are not warranted at this time.     Personal Factors Affecting Prognosis: Motivation          Patient Goal for Therapy (SLP): Assess speech clarity  Prognosis: Good       Education  Education was done with Patient. The patient's learning style includes Listening. The patient Verbalizes understanding.   Discussed role of Speech Therapy, recommendations, motor speech strategies, home exercise program       Plan: From a speech language pathology perspective, the patient would not benefit from: Skilled Rehab Services         Patient's spiritual, cultural, and educational needs considered and patient agreeable to plan of care and goals.      LIZANDRO Vaughn L-SLP, CCC-SLP  Speech Language Pathologist   5/2/2025               Sincerely,      MARLON Vaughn, CCC-SLP  [facility]                                                            Dear MARLON Vaughn, CCC-SLP,    RE: Mr. Goldy Irizarry, MRN: [id number]    I certify that I have reviewed the attached plan of care and agree to the details within.        ___________________________  ___________________________  Provider Printed Name   Provider Signed Name      ___________________________  Date and Time

## 2025-05-02 NOTE — PATIENT INSTRUCTIONS
Motor Speech Strategies:  Speak Slowly / Pacing: Try to slow your rate of speech when talking while keeping the same intonation or sing-song quality that is natural to your voice.   Overarticulation: Over-say all of the sounds in your words.  Speak Loudly: make sure to project your voice so that others can hear you.  Deep Breath: Make sure to use deep breathing to support your speech. If you do not have enough breath support, it is difficult to over-articulate, speak loudly, or speak slowly.   Open Your Mouth: Make sure to open your mouth widely while speaking.     Home exercise program:   Read aloud for 5-10 minutes, 2x per day utilizing motor speech strategies - read something that is interesting to you.

## 2025-05-05 ENCOUNTER — OFFICE VISIT (OUTPATIENT)
Dept: NEUROLOGY | Facility: CLINIC | Age: 55
End: 2025-05-05
Payer: COMMERCIAL

## 2025-05-05 VITALS
BODY MASS INDEX: 24.7 KG/M2 | DIASTOLIC BLOOD PRESSURE: 82 MMHG | SYSTOLIC BLOOD PRESSURE: 150 MMHG | HEIGHT: 76 IN | WEIGHT: 202.81 LBS

## 2025-05-05 DIAGNOSIS — E78.5 HYPERLIPIDEMIA LDL GOAL <70: ICD-10-CM

## 2025-05-05 DIAGNOSIS — Q21.12 PFO (PATENT FORAMEN OVALE): ICD-10-CM

## 2025-05-05 DIAGNOSIS — Z86.73 H/O ISCHEMIC RIGHT MCA STROKE: Primary | ICD-10-CM

## 2025-05-05 PROCEDURE — 3077F SYST BP >= 140 MM HG: CPT | Mod: CPTII,S$GLB,, | Performed by: NURSE PRACTITIONER

## 2025-05-05 PROCEDURE — 1160F RVW MEDS BY RX/DR IN RCRD: CPT | Mod: CPTII,S$GLB,, | Performed by: NURSE PRACTITIONER

## 2025-05-05 PROCEDURE — 99999 PR PBB SHADOW E&M-EST. PATIENT-LVL III: CPT | Mod: PBBFAC,,, | Performed by: NURSE PRACTITIONER

## 2025-05-05 PROCEDURE — 1159F MED LIST DOCD IN RCRD: CPT | Mod: CPTII,S$GLB,, | Performed by: NURSE PRACTITIONER

## 2025-05-05 PROCEDURE — 3044F HG A1C LEVEL LT 7.0%: CPT | Mod: CPTII,S$GLB,, | Performed by: NURSE PRACTITIONER

## 2025-05-05 PROCEDURE — 3008F BODY MASS INDEX DOCD: CPT | Mod: CPTII,S$GLB,, | Performed by: NURSE PRACTITIONER

## 2025-05-05 PROCEDURE — 3079F DIAST BP 80-89 MM HG: CPT | Mod: CPTII,S$GLB,, | Performed by: NURSE PRACTITIONER

## 2025-05-05 PROCEDURE — 99215 OFFICE O/P EST HI 40 MIN: CPT | Mod: S$GLB,,, | Performed by: NURSE PRACTITIONER

## 2025-05-05 NOTE — LETTER
May 6, 2025      [facility] - Neurology 8th Fl  [address]  [facility] [address]  Phone: [phone]  Fax: [phone]       Patient: Goldy Irizarry   YOB: 1970  Date of Visit: 05/06/2025    To Whom It May Concern:    Christian Irizarry  was at [facility] on 05/05/2025. He may return to work/school on 5/7/25 with no restrictions. If you have any questions or concerns, or if I can be of further assistance, please do not hesitate to contact me.    Sincerely,    Leatha Jenkins, NP

## 2025-05-05 NOTE — PROGRESS NOTES
[facility]ULAR NEUROLOGY CLINIC VISIT      SUBJECTIVE:    History for Present Illness: Goldy Irizarry is a 54 y.o.  male  with PMHx of PFO and dyslipidemia who presents to me today for hospital follow-up    Relevant HPI:  Per patient and chart review he presented to [facility] ED with left-sided weakness and left facial droop on 03/27/2025.  CTH without acute bleed.  CTA head and neck with right M1 occlusion.  Patient is s/p TNK.  Patient was taken to Neurointerventional for mechanical thrombectomy with TICI 3 reperfusion.MRI radiology read indicated a right MCA stroke.      Interval history:    At the time of today's visit, the patient denies new or worsening focal neurologic symptoms concerning for new stroke or TIA. He is doing well overall and reports being back to pre-stroke baseline. He denies associated vertigo, double vision, focal weakness or numbness, gait imbalance,  language or visual disturbances.  He is independent with ADLs.  He denies alcohol/tobacco/recreational drug use.  He is not currently participating in outpatient therapy as he is at functional baseline.  He is adherent with home medications including aspirin Plavix and statin        Past Medical History:   Diagnosis Date    Acute cerebrovascular accident (CVA) due to occlusion of right middle cerebral artery 03/27/2025    Dyslipidemia 04/15/2025    History of colon polyps 07/10/2024    7-2024: Next in 3 years   - One 5 mm polyp in the transverse colon, removed                          with a cold snare. Resected and retrieved.                          - One 2 mm polyp in the transverse colon, removed                          with a jumbo cold forceps. Resected and retrieved.                          - One 5 mm polyp in the descending colon, removed                          with     PFO (patent foramen ovale) 04/05/2025 4-2025  PFO, and not a sinus of Valsalva ASD or pulmonary AVM. Recommend proceeding w percutaneous closure of  "the PFO.  My additional recommendations are as follows:      Complete work up to r/o a hypercoagulable state.    Refer to Hematology for the same reason either e-consult or in-person.    Switch dual antiplatelet therapy to anticoagulant. (I understand that this is the most controversial re    Vitamin D insufficiency 01/10/2024     Past Surgical History:   Procedure Laterality Date    COLONOSCOPY N/A 7/10/2024    Procedure: COLONOSCOPY;  Surgeon: Travis Arana MD;  Location: [facility] ENDO ([address] [place] [address]);  Service: Endoscopy;  Laterality: N/A;  ref by / domonique inst  rescheduled-resent prep/Suflave and mailed instructions-KP  7/2-precall complete-KPvt  7/9-pt moved from extra screening to Dr. Arana-Marco    ECHOCARDIOGRAM,TRANSESOPHAGEAL N/A 4/4/2025    Procedure: Transesophageal echo (JAYLON) intra-procedure log documentation;  Surgeon: Provider, [facility];  Location: [facility] EP LAB;  Service: Cardiology;  Laterality: N/A;     Family History   Problem Relation Name Age of Onset    Diabetes Mother      Hypertension Mother      No Known Problems Father      Colon cancer Neg Hx      Colon polyps Neg Hx        Current Medications[1]     Review of Systems:  Review of systems is negative except for the symptoms mentioned in HPI    Physical exam:    BP (!) 150/82 (BP Location: Right arm, Patient Position: Sitting)   Ht 6' 4" (1.93 m)   Wt 92 kg (202 lb 13.2 oz)   BMI 24.69 kg/m²     General: Well-developed, well-groomed. No apparent distress  HENT: Normocephalic, atraumatic.    Cardiovascular: Regular rate and rhythm  Chest: No audible wheezes, stridor, ronchi appreciated.  Musculoskeletal: No peripheral edema     Neurologic Exam: The patient is awake, alert and oriented to person, place, time and situation. Attentive, vigilant during exam. Language is fluent. Naming & repetition intact, +2-step commands.  Fund of knowledge is appropriate. Well organized thoughts.     Cranial nerves:   CN II: Visual fields are full to " confrontation. Pupils are 4 mm and briskly reactive to light.  CN III, IV, VI: EOMI, no nystagmus, no ptosis  CN V: Facial sensation is intact in all 3 divisions bilaterally.  CN VII: Face is symmetric with normal eye closure and smile.  CN VIII: Hearing is normal bilaterally  CN IX, X: Palate elevates symmetrically. Phonation is normal.  CN XI: Head turning and shoulder shrug are intact  CN XII: Tongue is midline with normal movements and no atrophy.     Motor examination of all extremities demonstrates normal bulk and tone in all four limbs. There are no atrophy or fasciculations.        Left Right     Left Right   Deltoid 5/5 5/5   Hip Flexion 5/5 5/5   Biceps 5/5 5/5   Hip Extension 5/5 5/5   Triceps 5/5 5/5   Knee Flexion 5/5 5/5   Wrist Ext 5/5 5/5   Knee Extension 5/5 5/5   Finger Abd 5/5 5/5   Ankle dorsiflex 5/5 5/5           Ankle plantar flex 5/5 5/5     Sensory examination is normal light touch in BUE and BLE.       Gait: Posture is normal. Gait is steady with normal steps, base, arm swing, and turning.      Coordination:  Rapid alternating movements and fine finger movements are intact.         Relevant Labwork:  Recent Labs   Lab 03/27/25  1836 03/27/25 2057   Hemoglobin A1C  --  4.9   LDL Calculated 119  --    HDL 36 L  --    Triglycerides 86  --    Cholesterol 172  --        Diagnostic Results:  Imaging was independently reviewed by myself, along with the associated radiology report    Brain Imaging   MRI of the brain OS 03/27/2025:  Per OSH radiology read acute ischemic changes in the right basal ganglia  Small scattered foci of acute ischemia in the right frontal and temporal lobes  Vessel Imaging   CTA head and neck OSH 03/27/2025:  Occlusion of the right M1 segment 1 cm distal from its origin  Modal attenuate plaque in the proximal right ICA without significant stenosis  Cardiac Imaging   TTE 04/04/2025:   Left Ventricle: The left ventricle is normal in size. Normal wall thickness. There is  normal systolic function.  Right Ventricle: The right ventricle is normal in size. Systolic function is normal.  Left Atrium: There is an atrial septal aneurysm bulging to the left with a maximal excusion of 1.1 cm. A small patent foramen ovale is visualized indicated by saline contrast.  Valves: Structurally normal valves.  Aorta: There is no plaque within the thoracic aorta.  Assessment:  1. H/O ischemic right MCA stroke        2. PFO (patent foramen ovale)        3. Hyperlipidemia LDL goal <70          Goldy Irizarry  is a 54 y.o.  male  seen today in clinic for follow-up assessment and recommendations. The following recommendations and plan were discussed in depth with the patient who voiced understanding and was in agreement.  Plan:  History of right MCA stroke  -Stroke etiology suspected embolic (PFO)  --In-depth discussion with patient regarding diagnosis ,imaging findings  & stroke risk factors  -No current clinical indication for anticoagulation at this time.   -Plan for aggressive risk factor modification and maximum medical management  -- Antithrombotics/ Anticoagulation: Aspirin 81 mg daily, Clopidogrel 75 mg daily  x 21 days following discharge, followed by monotherapy with ASA 81 mg daily, indefinitely   - Stroke Risk Factors:  PFO, HLD  - Lipid Management: Target is LDL < 70mg/dL. Continue atorvastatin 40 mg daily. Monitoring for liver dysfunction and myopathy is suggested for statins. To be addressed by PCP  -Hypertension: Long term goal is normotension w/ target BP of less than 130/80 mmHg.  Continue home medications and follow up with PCP for surveillance and chronic management  - Rehab efforts:  None  - Diagnostics ordered/pending:  None    PFO  Continue to follow up outpatient with Dr. Worthy for PFO closure     Patient/Family teaching provided during this visit  -Identifying the signs and symptoms of stroke; emergency action and ER attention  -Risk factor control  -Optimization for secondary  stroke prevention including compliance with current medications   -We discussed the need to optimize lifestyle choices   -heart healthy diet (Mediterranean,MIND  or dash) to include a variety of brain friendly foods to help optimize cognitive health and longevity  -increased cardiovascular exercise; goal of 150 minutes of moderate-intense per week  -Discussed that medication/lifestyle modifications are preventative,and nothing is absolute.     Questions and concerns were answered to the patient's stated verbal satisfaction. The patient verbalizes understanding and agreement with the above stated treatment plan.      I will plan on having Goldy Irizarry return to clinic as needed. The patient can contact my office with any questions or concerns they may have as they arise in the interim.       Collaborating Physician, Dr Guillory, was available during today's encounter. Any change to plan along with cosign to appear in the EMR    MAHESH Aranda  Department of Vascular Neurology  [facility]  [phone]    This note is dictated on M*Modal Fluency Direct word recognition program. There are word recognition mistakes that are occasionally missed on review               [1]   Current Outpatient Medications:     allopurinoL (ZYLOPRIM) 100 MG tablet, Take 1 tablet (100 mg total) by mouth once daily., Disp: 90 tablet, Rfl: 3    aspirin 81 MG Chew, Take 1 tablet by mouth once daily., Disp: , Rfl:     atorvastatin (LIPITOR) 40 MG tablet, Take 1 tablet (40 mg total) by mouth once daily., Disp: 90 tablet, Rfl: 3    cholecalciferol, vitamin D3, 125 mcg (5,000 unit) Tab, Take 1 tablet (5,000 Units total) by mouth once daily., Disp: , Rfl:     clopidogreL (PLAVIX) 75 mg tablet, Take 1 tablet (75 mg total) by mouth once daily., Disp: 90 tablet, Rfl: 3    colchicine (COLCRYS) 0.6 mg tablet, Take 1 tablet (0.6 mg total) by mouth 2 (two) times daily as needed (gout)., Disp: 30 tablet, Rfl: 11

## 2025-05-06 ENCOUNTER — LAB VISIT (OUTPATIENT)
Dept: LAB | Facility: HOSPITAL | Age: 55
End: 2025-05-06
Attending: INTERNAL MEDICINE
Payer: COMMERCIAL

## 2025-05-06 DIAGNOSIS — I63.9 CRYPTOGENIC STROKE: ICD-10-CM

## 2025-05-06 DIAGNOSIS — I63.511 ACUTE CEREBROVASCULAR ACCIDENT (CVA) DUE TO OCCLUSION OF RIGHT MIDDLE CEREBRAL ARTERY: ICD-10-CM

## 2025-05-06 LAB
ANION GAP (OHS): 8 MMOL/L (ref 8–16)
BUN SERPL-MCNC: 15 MG/DL (ref 6–20)
CALCIUM SERPL-MCNC: 9.1 MG/DL (ref 8.7–10.5)
CHLORIDE SERPL-SCNC: 106 MMOL/L (ref 95–110)
CO2 SERPL-SCNC: 26 MMOL/L (ref 23–29)
CREAT SERPL-MCNC: 1.1 MG/DL (ref 0.5–1.4)
ERYTHROCYTE [DISTWIDTH] IN BLOOD BY AUTOMATED COUNT: 12.9 % (ref 11.5–14.5)
GFR SERPLBLD CREATININE-BSD FMLA CKD-EPI: >60 ML/MIN/1.73/M2
GLUCOSE SERPL-MCNC: 89 MG/DL (ref 70–110)
HCT VFR BLD AUTO: 41.7 % (ref 40–54)
HGB BLD-MCNC: 13.1 GM/DL (ref 14–18)
MCH RBC QN AUTO: 27.5 PG (ref 27–31)
MCHC RBC AUTO-ENTMCNC: 31.4 G/DL (ref 32–36)
MCV RBC AUTO: 88 FL (ref 82–98)
PLATELET # BLD AUTO: 291 K/UL (ref 150–450)
PMV BLD AUTO: 9.5 FL (ref 9.2–12.9)
POTASSIUM SERPL-SCNC: 4 MMOL/L (ref 3.5–5.1)
RBC # BLD AUTO: 4.76 M/UL (ref 4.6–6.2)
SODIUM SERPL-SCNC: 140 MMOL/L (ref 136–145)
WBC # BLD AUTO: 4.14 K/UL (ref 3.9–12.7)

## 2025-05-06 PROCEDURE — 85027 COMPLETE CBC AUTOMATED: CPT

## 2025-05-06 PROCEDURE — 84132 ASSAY OF SERUM POTASSIUM: CPT

## 2025-05-06 PROCEDURE — 36415 COLL VENOUS BLD VENIPUNCTURE: CPT

## 2025-05-08 ENCOUNTER — TELEPHONE (OUTPATIENT)
Dept: CARDIOLOGY | Facility: CLINIC | Age: 55
End: 2025-05-08
Payer: COMMERCIAL

## 2025-05-08 NOTE — TELEPHONE ENCOUNTER
Attempted to call patient regarding change to time of arrival for procedure tomorrow to 6:30am.  No answer and VM full.

## 2025-05-09 ENCOUNTER — CLINICAL SUPPORT (OUTPATIENT)
Dept: CARDIOLOGY | Facility: HOSPITAL | Age: 55
End: 2025-05-09
Attending: INTERNAL MEDICINE
Payer: COMMERCIAL

## 2025-05-09 ENCOUNTER — TELEPHONE (OUTPATIENT)
Dept: ADMINISTRATIVE | Facility: CLINIC | Age: 55
End: 2025-05-09
Payer: COMMERCIAL

## 2025-05-09 ENCOUNTER — HOSPITAL ENCOUNTER (OUTPATIENT)
Facility: HOSPITAL | Age: 55
Discharge: HOME | End: 2025-05-09
Attending: INTERNAL MEDICINE | Admitting: INTERNAL MEDICINE
Payer: COMMERCIAL

## 2025-05-09 VITALS
SYSTOLIC BLOOD PRESSURE: 141 MMHG | RESPIRATION RATE: 18 BRPM | OXYGEN SATURATION: 97 % | BODY MASS INDEX: 24.84 KG/M2 | TEMPERATURE: 98 F | WEIGHT: 204 LBS | DIASTOLIC BLOOD PRESSURE: 78 MMHG | HEART RATE: 69 BPM | HEIGHT: 76 IN

## 2025-05-09 DIAGNOSIS — Z01.818 PRE-OP TESTING: ICD-10-CM

## 2025-05-09 DIAGNOSIS — Q21.12 PFO (PATENT FORAMEN OVALE): ICD-10-CM

## 2025-05-09 LAB
ABORH RETYPE: NORMAL
INDIRECT COOMBS: NORMAL
OHS QRS DURATION: 84 MS
OHS QTC CALCULATION: 442 MS
RH BLD: NORMAL
SPECIMEN OUTDATE: NORMAL

## 2025-05-09 PROCEDURE — C1817 SEPTAL DEFECT IMP SYS: HCPCS | Performed by: INTERNAL MEDICINE

## 2025-05-09 PROCEDURE — 93010 ELECTROCARDIOGRAM REPORT: CPT | Mod: ,,, | Performed by: INTERNAL MEDICINE

## 2025-05-09 PROCEDURE — 99152 MOD SED SAME PHYS/QHP 5/>YRS: CPT | Performed by: INTERNAL MEDICINE

## 2025-05-09 PROCEDURE — C1894 INTRO/SHEATH, NON-LASER: HCPCS | Performed by: INTERNAL MEDICINE

## 2025-05-09 PROCEDURE — 99152 MOD SED SAME PHYS/QHP 5/>YRS: CPT | Mod: ,,, | Performed by: INTERNAL MEDICINE

## 2025-05-09 PROCEDURE — C1769 GUIDE WIRE: HCPCS | Performed by: INTERNAL MEDICINE

## 2025-05-09 PROCEDURE — 93580 TRANSCATH CLOSURE OF ASD: CPT | Mod: ,,, | Performed by: INTERNAL MEDICINE

## 2025-05-09 PROCEDURE — 25500020 PHARM REV CODE 255: Performed by: INTERNAL MEDICINE

## 2025-05-09 PROCEDURE — 25000003 PHARM REV CODE 250: Performed by: INTERNAL MEDICINE

## 2025-05-09 PROCEDURE — 93271 ECG/MONITORING AND ANALYSIS: CPT

## 2025-05-09 PROCEDURE — 93005 ELECTROCARDIOGRAM TRACING: CPT

## 2025-05-09 PROCEDURE — 63600175 PHARM REV CODE 636 W HCPCS: Performed by: INTERNAL MEDICINE

## 2025-05-09 PROCEDURE — C1753 CATH, INTRAVAS ULTRASOUND: HCPCS | Performed by: INTERNAL MEDICINE

## 2025-05-09 PROCEDURE — 93580 TRANSCATH CLOSURE OF ASD: CPT | Performed by: INTERNAL MEDICINE

## 2025-05-09 PROCEDURE — 27201423 OPTIME MED/SURG SUP & DEVICES STERILE SUPPLY: Performed by: INTERNAL MEDICINE

## 2025-05-09 PROCEDURE — 86900 BLOOD TYPING SEROLOGIC ABO: CPT | Performed by: INTERNAL MEDICINE

## 2025-05-09 PROCEDURE — 93662 INTRACARDIAC ECG (ICE): CPT | Mod: 26,,, | Performed by: INTERNAL MEDICINE

## 2025-05-09 PROCEDURE — 93662 INTRACARDIAC ECG (ICE): CPT | Performed by: INTERNAL MEDICINE

## 2025-05-09 PROCEDURE — 99153 MOD SED SAME PHYS/QHP EA: CPT | Performed by: INTERNAL MEDICINE

## 2025-05-09 DEVICE — CARDIOFORM SEPTAL OCCLUDER 30MM 10FR
Type: IMPLANTABLE DEVICE | Site: HEART | Status: FUNCTIONAL
Brand: GORE CARDIOFORM SEPTAL OCCLUDER

## 2025-05-09 RX ORDER — HEPARIN SOD,PORCINE/0.9 % NACL 1000/500ML
INTRAVENOUS SOLUTION INTRAVENOUS
Status: DISCONTINUED | OUTPATIENT
Start: 2025-05-09 | End: 2025-05-09 | Stop reason: HOSPADM

## 2025-05-09 RX ORDER — IODIXANOL 320 MG/ML
INJECTION, SOLUTION INTRAVASCULAR
Status: DISCONTINUED | OUTPATIENT
Start: 2025-05-09 | End: 2025-05-09 | Stop reason: HOSPADM

## 2025-05-09 RX ORDER — SODIUM CHLORIDE 9 MG/ML
INJECTION, SOLUTION INTRAVENOUS CONTINUOUS
Status: ACTIVE | OUTPATIENT
Start: 2025-05-09 | End: 2025-05-09

## 2025-05-09 RX ORDER — CEFAZOLIN SODIUM 1 G/3ML
INJECTION, POWDER, FOR SOLUTION INTRAMUSCULAR; INTRAVENOUS
Status: DISCONTINUED | OUTPATIENT
Start: 2025-05-09 | End: 2025-05-09 | Stop reason: HOSPADM

## 2025-05-09 RX ORDER — HEPARIN SODIUM 1000 [USP'U]/ML
INJECTION, SOLUTION INTRAVENOUS; SUBCUTANEOUS
Status: DISCONTINUED | OUTPATIENT
Start: 2025-05-09 | End: 2025-05-09 | Stop reason: HOSPADM

## 2025-05-09 RX ORDER — MIDAZOLAM HYDROCHLORIDE 1 MG/ML
INJECTION, SOLUTION INTRAMUSCULAR; INTRAVENOUS
Status: DISCONTINUED | OUTPATIENT
Start: 2025-05-09 | End: 2025-05-09 | Stop reason: HOSPADM

## 2025-05-09 RX ORDER — ACETAMINOPHEN 325 MG/1
650 TABLET ORAL EVERY 4 HOURS PRN
Status: DISCONTINUED | OUTPATIENT
Start: 2025-05-09 | End: 2025-05-09 | Stop reason: HOSPADM

## 2025-05-09 RX ORDER — SODIUM CHLORIDE 9 MG/ML
INJECTION, SOLUTION INTRAVENOUS CONTINUOUS
Status: DISCONTINUED | OUTPATIENT
Start: 2025-05-09 | End: 2025-05-09 | Stop reason: HOSPADM

## 2025-05-09 RX ORDER — DIPHENHYDRAMINE HCL 50 MG
50 CAPSULE ORAL ONCE
Status: COMPLETED | OUTPATIENT
Start: 2025-05-09 | End: 2025-05-09

## 2025-05-09 RX ORDER — FENTANYL CITRATE 50 UG/ML
INJECTION, SOLUTION INTRAMUSCULAR; INTRAVENOUS
Status: DISCONTINUED | OUTPATIENT
Start: 2025-05-09 | End: 2025-05-09 | Stop reason: HOSPADM

## 2025-05-09 RX ORDER — ONDANSETRON 8 MG/1
8 TABLET, ORALLY DISINTEGRATING ORAL EVERY 8 HOURS PRN
Status: DISCONTINUED | OUTPATIENT
Start: 2025-05-09 | End: 2025-05-09 | Stop reason: HOSPADM

## 2025-05-09 RX ORDER — LIDOCAINE HYDROCHLORIDE 20 MG/ML
INJECTION, SOLUTION EPIDURAL; INFILTRATION; INTRACAUDAL; PERINEURAL
Status: DISCONTINUED | OUTPATIENT
Start: 2025-05-09 | End: 2025-05-09 | Stop reason: HOSPADM

## 2025-05-09 RX ADMIN — SODIUM CHLORIDE: 9 INJECTION, SOLUTION INTRAVENOUS at 07:05

## 2025-05-09 RX ADMIN — DIPHENHYDRAMINE HYDROCHLORIDE 50 MG: 50 CAPSULE ORAL at 08:05

## 2025-05-09 NOTE — PLAN OF CARE
Bedrest protocol completed. Pt ambulated in hallway to the restroom and around the nurses desk without difficulty. Right groin gauze/transparent dressing is c/d/I. No active bleeding. No hematoma noted. Family member at bedside. Nurse call bell within reach. Will monitor.

## 2025-05-09 NOTE — BRIEF OP NOTE
Brief Operative Note:    : Garo Jean Baptiste MD     Referring Physician: Garo Jean Baptiste     All Operators: Surgeon(s):  Mark Kelley Jr., Ryan Lee MD Hallak, MD Deacon Hess Jose D., MD     Preoperative Diagnosis: PFO (patent foramen ovale) [Q21.12]     Postop Diagnosis: PFO (patent foramen ovale) [Q21.12]    Treatments/Procedures: Procedure(s) (LRB):  Closure, PFO (N/A)    Findings: Successful PFO closure. See catheterization report for full details.    Estimated Blood loss: 20 cc    Specimens removed: No    Recommendations:   - Routine post-cath care as per orders  - IVF at 200 cc/hr for 4 hrs  - DAPT for 6 months  - follow up in clinic    Desiree Trejo

## 2025-05-09 NOTE — TELEPHONE ENCOUNTER
Mr. Irizarry is currently taking atorvastatin 40 mg hs (80 mg was initiated on 3/30 and then decreased to 40 mg hs on 4/15/25). No changes at this time.    ----- Message from ELENA Zhang sent at 2025  7:08 AM CDT -----  Regarding: Order for JUDITH IRIZARRY    Patient Name: JUDITH IRIZARRY([id number])  Sex: Male  : 1970      PCP: BHAVIN RIVERA    Center: [facility] CENTRAL BILLING OFFICE     Types of orders made on 2025: Blood Bank, Diet, ECG, IV, Medications,                                       Nursing, Transfer    Order Date:4/10/2025  Ordering User:MICHELLE ELLINGTON [[id number]]  Attending Provider:Garo Jean Baptiste MD [[id number]]  Authorizing Provider: Garo Jean Baptiste MD [[id number]]  Department:[facility] SHORT STAY CARDIAC UNIT[[id number]]    Order Specific Information  Order: Notify [facility] [Custom: [id number]]  Order #: [id number]: 1    Priority: Routine  Class: Hospital Performed    Associated Diagnoses      Q21.12 PFO (patent foramen ovale)    Released on: 2025  7:08 AM        Priority: Routine  Class: Hospital Performed    Associated Diagnoses      Q21.12 PFO (patent foramen ovale)    Released on: 2025  7:08 AM

## 2025-05-09 NOTE — PLAN OF CARE
Received report from ELENA Kathleen. Patient s/p PFO closure, AAOx3. VSS, no c/o pain or discomfort at this time, resp even and unlabored. Quick clot gauze/tegaderm dressing to R groin is CDI. No active bleeding. No hematoma noted. Post procedure protocol reviewed with patient and patient's family. Understanding verbalized. Family members at bedside. Nurse call bell within reach.

## 2025-05-09 NOTE — Clinical Note
10 ml of contrast were injected throughout the case. 190 mL of contrast was the total wasted during the case. 200 mL was the total amount used during the case.

## 2025-05-09 NOTE — PLAN OF CARE
Pt arrived to unit accompanied by family.  Pre op orders and assessment initiated.  Pt remains npo.  Call bell within reach.

## 2025-05-09 NOTE — PLAN OF CARE
IV fluids complete. Patient able to eat, ambulate and void independently post procedure. Discharge instructions and prescriptions reviewed with patient. Patient verbalizes understanding. VSS. IV removed. ACIN.

## 2025-05-09 NOTE — DISCHARGE SUMMARY
[facility] - Short Stay Cardiac Unit  Discharge Note  Short Stay    Procedure(s) (LRB):  Closure, PFO (N/A)      OUTCOME: Patient tolerated treatment/procedure well without complication and is now ready for discharge.    DISPOSITION: Home or Self Care    FINAL DIAGNOSIS:  PFO (patent foramen ovale)    FOLLOWUP: In clinic    DISCHARGE INSTRUCTIONS:  No discharge procedures on file.        Medication List        CONTINUE taking these medications      allopurinoL 100 MG tablet  Commonly known as: ZYLOPRIM  Take 1 tablet (100 mg total) by mouth once daily.     aspirin 81 MG Chew     atorvastatin 40 MG tablet  Commonly known as: LIPITOR  Take 1 tablet (40 mg total) by mouth once daily.     cholecalciferol (vitamin D3) 125 mcg (5,000 unit) Tab  Take 1 tablet (5,000 Units total) by mouth once daily.     clopidogreL 75 mg tablet  Commonly known as: PLAVIX  Take 1 tablet (75 mg total) by mouth once daily.     colchicine 0.6 mg tablet  Commonly known as: COLCRYS  Take 1 tablet (0.6 mg total) by mouth 2 (two) times daily as needed (gout).                TIME SPENT ON DISCHARGE: 15 minutes

## 2025-05-09 NOTE — INTERVAL H&P NOTE
"The patient has been examined and the H&P has been reviewed:    I concur with the findings and no changes have occurred since H&P was written.    Procedure risks, benefits and alternative options discussed and understood by patient/family.    Physical Examination  BP (!) 171/82 (BP Location: Left arm, Patient Position: Lying)   Pulse 97   Temp 98.4 °F (36.9 °C) (Temporal)   Resp 18   Ht 6' 4" (1.93 m)   Wt 92.5 kg (204 lb)   SpO2 98%   BMI 24.83 kg/m²     Constitutional: Awake, alert, oriented, no acute distress, conversant  HEENT: Sclera anicteric, Pupils equal, round and reactive to light, extraocular motions intact  Neck: No JVD, no carotid bruits  Cardiovascular: normal rate, regular rhythm  Pulmonary: Clear to auscultation bilaterally  Abdominal: Abdomen soft, nontender, nondistended  Skin: No ecchymosis, erythema, or ulcers  Psych: Alert and oriented x 3, appropriate affect  Neuro: CNII-XII intact, no focal deficits        Active Hospital Problems    Diagnosis  POA    *PFO (patent foramen ovale) [Q21.12]  Not Applicable     Priority: 1 - High     4-2025  PFO, and not a sinus of Valsalva ASD or pulmonary AVM. Recommend proceeding w percutaneous closure of the PFO.  My additional recommendations are as follows:  Complete work up to r/o a hypercoagulable state.  Refer to Hematology for the same reason either e-consult or in-person.  Switch dual antiplatelet therapy to anticoagulant. (I understand that this is the most controversial recommendation and understandably he wishes to discuss this w his Neurologist at [facility]).  30 day event monitor.  Age appropriate cancer screen (will defer to his PCP).      Acute cerebrovascular accident (CVA) due to occlusion of right middle cerebral artery [I63.511]  Yes     Priority: 1 - High      Resolved Hospital Problems   No resolved problems to display.     "

## 2025-05-19 ENCOUNTER — TELEPHONE (OUTPATIENT)
Dept: CARDIOLOGY | Facility: CLINIC | Age: 55
End: 2025-05-19
Payer: COMMERCIAL

## 2025-05-19 DIAGNOSIS — R20.2 NUMBNESS AND TINGLING OF FOOT: Primary | ICD-10-CM

## 2025-05-19 DIAGNOSIS — R20.0 NUMBNESS AND TINGLING OF FOOT: Primary | ICD-10-CM

## 2025-05-19 NOTE — TELEPHONE ENCOUNTER
"Patient called to check in following PFO closure on 5/9/25.  He states that he feels great but he is concerned about his right foot.  He reports that it is a "little numb" the past couple of days.  He has a small knot in his right groin area that is non-painful.  There is not bleeding or swelling and his leg and foot are warm to touch.  Will schedule pseudo eval US and follow up w/ IC PA.  Patient scheduled for Friday 5/23,  Advised to monitor area and foot. If numbness worsens or he develops pain, bleeding or swelling to go to nearest ED.   "

## 2025-05-23 ENCOUNTER — OFFICE VISIT (OUTPATIENT)
Dept: CARDIOLOGY | Facility: CLINIC | Age: 55
End: 2025-05-23
Payer: COMMERCIAL

## 2025-05-23 ENCOUNTER — HOSPITAL ENCOUNTER (OUTPATIENT)
Dept: CARDIOLOGY | Facility: HOSPITAL | Age: 55
Discharge: HOME | End: 2025-05-23
Attending: INTERNAL MEDICINE
Payer: COMMERCIAL

## 2025-05-23 VITALS
WEIGHT: 203.06 LBS | DIASTOLIC BLOOD PRESSURE: 92 MMHG | BODY MASS INDEX: 24.73 KG/M2 | SYSTOLIC BLOOD PRESSURE: 141 MMHG | HEART RATE: 75 BPM | OXYGEN SATURATION: 97 % | HEIGHT: 76 IN

## 2025-05-23 DIAGNOSIS — I97.630 POSTOPERATIVE HEMATOMA INVOLVING CIRCULATORY SYSTEM FOLLOWING CARDIAC CATHETERIZATION: ICD-10-CM

## 2025-05-23 DIAGNOSIS — R20.0 NUMBNESS AND TINGLING OF FOOT: ICD-10-CM

## 2025-05-23 DIAGNOSIS — Q21.12 PFO (PATENT FORAMEN OVALE): Primary | ICD-10-CM

## 2025-05-23 DIAGNOSIS — R20.2 NUMBNESS AND TINGLING OF FOOT: ICD-10-CM

## 2025-05-23 PROCEDURE — 93971 EXTREMITY STUDY: CPT | Mod: 26,,, | Performed by: INTERNAL MEDICINE

## 2025-05-23 PROCEDURE — 93926 LOWER EXTREMITY STUDY: CPT | Mod: 26,,, | Performed by: INTERNAL MEDICINE

## 2025-05-23 PROCEDURE — 99999 PR PBB SHADOW E&M-EST. PATIENT-LVL IV: CPT | Mod: PBBFAC,,,

## 2025-05-23 PROCEDURE — 93926 LOWER EXTREMITY STUDY: CPT

## 2025-05-23 NOTE — ASSESSMENT & PLAN NOTE
- Patient has history of cryptogenic stroke with confirmed PFO.  He underwent PFO closure on 5/9/2025.  - Continue with six-month follow-up with echo bubble study prior.  - Continue DAPT with aspirin and Plavix for at least 6 months.  Re-evaluate medications at next visit.  - Patient may resume to regular activities, including work and exercise, as tolerated.  Can resume running.

## 2025-05-23 NOTE — PROGRESS NOTES
"    Interventional Cardiology Clinic Note    HPI:     Goldy Irizarry is a 54 y.o. male with Hx of cryptogenic stroke 3/27/25 and PFO s/p PFO closure 5/2025 who presents for follow-up.    HPI:  Patient has a history of a cryptogenic stroke on 03/27/2025, which required mechanical thrombectomy. The scan showed right MCA occlusion. He underwent an extensive workup, which excluded DVT, AFib, and other obvious causes of stroke. A TTE with bubble study showed right to left shunting with Valsalva, with total opacification of the left-sided chambers with bubbles one beat, suggesting a very large PFO.     He underwent a PFO closure procedure on 05/09/2025 performed by Dr. Worthy. He noticed a non-painful, non-bleeding "knot" in the right groin area, slightly above the catheter site. He is here today for evaluation of a possible pseudoaneurysm. He denies associated tenderness, pain with movement/ hip flexion, or bruising/bleeding at the procedure site. Ultrasound was done prior to appt and did not show PA or AVF. He is currently on aspirin and Plavix, prescribed for 6 months post-procedure. He has been wearing a heart monitor for 14 days, though endorses some concerns that it has not been adhering well to the skin. He reports a feeling of numbness at the bottom of both feet, more pronounced in the right foot; states that this was present for some time now. There is no associated radiating or shooting pain or decreased strength. He is planning to return to work next week and gradually resume running. No acute concerns at this time.    MEDICATIONS:  Aspirin 81 mg, daily  Plavix, daily  Lipitor       ROS:      ROS findings as noted in HPI above.    PMH:     Past Medical History:   Diagnosis Date    Acute cerebrovascular accident (CVA) due to occlusion of right middle cerebral artery 03/27/2025    Dyslipidemia 04/15/2025    History of colon polyps 07/10/2024    7-2024: Next in 3 years   - One 5 mm polyp in the transverse colon, " removed                          with a cold snare. Resected and retrieved.                          - One 2 mm polyp in the transverse colon, removed                          with a jumbo cold forceps. Resected and retrieved.                          - One 5 mm polyp in the descending colon, removed                          with     PFO (patent foramen ovale) 04/05/2025 4-2025  PFO, and not a sinus of Valsalva ASD or pulmonary AVM. Recommend proceeding w percutaneous closure of the PFO.  My additional recommendations are as follows:      Complete work up to r/o a hypercoagulable state.    Refer to Hematology for the same reason either e-consult or in-person.    Switch dual antiplatelet therapy to anticoagulant. (I understand that this is the most controversial re    Vitamin D insufficiency 01/10/2024     Past Surgical History:   Procedure Laterality Date    CLOSURE, PFO N/A 5/9/2025    Procedure: Closure, PFO;  Surgeon: Garo Jean Baptiste MD;  Location: [facility] CATH LAB;  Service: Cardiology;  Laterality: N/A;    COLONOSCOPY N/A 7/10/2024    Procedure: COLONOSCOPY;  Surgeon: Travis Arana MD;  Location: [facility] ENDO ([address] [place] [address]);  Service: Endoscopy;  Laterality: N/A;  ref by / domonique inst  rescheduled-resent prep/Suflave and mailed instructions-KP  7/2-precall complete-KPvt  7/9-pt moved from extra screening to Dr. Arana-vt    ECHOCARDIOGRAM,TRANSESOPHAGEAL N/A 4/4/2025    Procedure: Transesophageal echo (JAYLON) intra-procedure log documentation;  Surgeon: Provider, Dosc Diagnostic;  Location: [facility] EP LAB;  Service: Cardiology;  Laterality: N/A;    ICE, PERFORMED  5/9/2025    Procedure: ICE, Performed;  Surgeon: Garo Jean Baptiste MD;  Location: [facility] CATH LAB;  Service: Cardiology;;     Allergies:   Review of patient's allergies indicates:  No Known Allergies  Medications:   Medications Ordered Prior to Encounter[1]  Social History:     Social History     Tobacco Use    Smoking status: Never    Smokeless  "tobacco: Never   Substance Use Topics    Alcohol use: No     Alcohol/week: 0.0 standard drinks of alcohol     Family History:     Family History   Problem Relation Name Age of Onset    Diabetes Mother      Hypertension Mother      No Known Problems Father      Colon cancer Neg Hx      Colon polyps Neg Hx       Physical Exam:   BP (!) 141/92   Pulse 75   Ht 6' 4" (1.93 m)   Wt 92.1 kg (203 lb 0.7 oz)   SpO2 97%   BMI 24.72 kg/m²        Physical Exam  Vitals and nursing note reviewed.   Constitutional:       General: He is not in acute distress.     Appearance: Normal appearance. He is not ill-appearing or toxic-appearing.   HENT:      Head: Normocephalic and atraumatic.   Eyes:      Pupils: Pupils are equal, round, and reactive to light.   Cardiovascular:      Rate and Rhythm: Normal rate.   Pulmonary:      Effort: Pulmonary effort is normal. No respiratory distress.   Musculoskeletal:         General: Normal range of motion.      Cervical back: Normal range of motion.   Skin:     General: Skin is warm and dry.      Capillary Refill: Capillary refill takes less than 2 seconds.   Neurological:      General: No focal deficit present.      Mental Status: He is alert.          Labs:     Lab Results   Component Value Date     05/06/2025     03/29/2025     03/12/2025    K 4.0 05/06/2025    K 4.1 03/29/2025    K 4.1 03/12/2025     05/06/2025     03/12/2025    CO2 26 05/06/2025    CO2 28 03/29/2025    CO2 22 (L) 03/12/2025    BUN 15 05/06/2025    CREATININE 1.1 05/06/2025    GLUCOSE 100 (H) 03/29/2025    ANIONGAP 8 05/06/2025     Lab Results   Component Value Date    HGBA1C 4.9 03/27/2025    HGBA1C 4.9 03/12/2025     No results found for: "BNP", "BNPTRIAGEBLO" Lab Results   Component Value Date    WBC 4.14 05/06/2025    HGB 13.1 (L) 05/06/2025    HGB 13.5 (L) 03/12/2025    HCT 41.7 05/06/2025    HCT 42.6 03/12/2025     05/06/2025     03/12/2025    GRAN 1.1 (L) 03/12/2025    " GRAN 29.4 (L) 03/12/2025     Lab Results   Component Value Date    CHOL 172 03/27/2025    CHOL 147 03/12/2025    HDL 36 (L) 03/27/2025    HDL 49 03/12/2025    LDLCALC 119 03/27/2025    LDLCALC 89.4 03/12/2025    TRIG 86 03/27/2025    TRIG 43 03/12/2025          Lipids:  Recent Labs   Lab 03/27/25  1836   LDL Calculated 119   HDL 36 L      Renal:  Recent Labs   Lab 05/06/25  1048   Creatinine 1.1   Potassium 4.0   CO2 26   BUN 15     Liver:  Recent Labs   Lab 03/27/25  1836   AST 27   ALT 17       Imaging:     CV pseudoaneurysm evaluation 5/23/2025:     No pseudoaneurysm was found in the right common femoral artery.    The right CFA shows no AV fistula.    No pseudoaneurysm was found in the right superficial femoral artery.    The right SFA shows no AV fistula.    No pseudoaneurysm was found in the right profunda femoris artery.    The right PFA shows no AV fistula.    Assessment & Plan:       ICD-10-CM ICD-9-CM   1. PFO (patent foramen ovale)  Q21.12 745.5   2. Postoperative hematoma involving circulatory system following cardiac catheterization  I97.630 998.12     PFO (patent foramen ovale) s/p closure  - Patient has history of cryptogenic stroke with confirmed PFO.  He underwent PFO closure on 5/9/2025.  - Continue with six-month follow-up with echo bubble study prior.  - Continue DAPT with aspirin and Plavix for at least 6 months.  Re-evaluate medications at next visit.  - Patient may resume to regular activities, including work and exercise, as tolerated.  Can resume running.    Postoperative hematoma involving circulatory system following cardiac catheterization  - Patient with concerns of knot at catheter access site for PFO closure.  - Ultrasound done prior to appointment to evaluate.  Results were reviewed.  No pseudoaneurysm or AV fistula.  No further concerns at this time.  Continue conservative management.      Signed:  Aracelis Peace PA-C  Interventional Cardiology        This note was generated with the  assistance of ambient listening technology. Verbal consent was obtained by the patient and accompanying visitor(s) for the recording of patient appointment to facilitate this note. I attest to having reviewed and edited the generated note for accuracy, though some syntax or spelling errors may persist. Please contact the author of this note for any clarification.           [1]   Current Outpatient Medications on File Prior to Visit   Medication Sig Dispense Refill    allopurinoL (ZYLOPRIM) 100 MG tablet Take 1 tablet (100 mg total) by mouth once daily. 90 tablet 3    aspirin 81 MG Chew Take 1 tablet by mouth once daily.      atorvastatin (LIPITOR) 40 MG tablet Take 1 tablet (40 mg total) by mouth once daily. 90 tablet 3    cholecalciferol, vitamin D3, 125 mcg (5,000 unit) Tab Take 1 tablet (5,000 Units total) by mouth once daily.      clopidogreL (PLAVIX) 75 mg tablet Take 1 tablet (75 mg total) by mouth once daily. 90 tablet 3    colchicine (COLCRYS) 0.6 mg tablet Take 1 tablet (0.6 mg total) by mouth 2 (two) times daily as needed (gout). 30 tablet 11     No current facility-administered medications on file prior to visit.

## 2025-05-23 NOTE — ASSESSMENT & PLAN NOTE
- Patient with concerns of knot at catheter access site for PFO closure.  - Ultrasound done prior to appointment to evaluate.  Results were reviewed.  No pseudoaneurysm or AV fistula.  No further concerns at this time.  Continue conservative management.

## 2025-05-24 LAB
RIGHT GROIN LYMPH NODE AP: 1.5 CM
RIGHT GROIN LYMPH NODE TRANS: 0.8 CM

## 2025-05-28 ENCOUNTER — TELEPHONE (OUTPATIENT)
Dept: CARDIOLOGY | Facility: HOSPITAL | Age: 55
End: 2025-05-28
Payer: COMMERCIAL

## 2025-05-28 NOTE — TELEPHONE ENCOUNTER
Patient wearing 30 day event monitor for diagnosis patent foramen ovale.     Received auto-triggered alert notification for new onset AF on May 28, 2025 at 8: 43 AM.  Ventricular rates ranging from 103-143 bpm.         Called patient to assess for symptoms. Patient states he was awake at time of event but asymptomatic. Reports he may have been doing a light workout. Denies SOB, palpitations, etc.     Takes Plavix and ASA only.    Strips placed under this encounter for review. Full PDF can be found linked to this encounter or under Media tab.     Message sent to ordering provider. Will continue to monitor until 6/8/25.

## 2025-05-29 DIAGNOSIS — I48.0 PAROXYSMAL ATRIAL FIBRILLATION: Primary | ICD-10-CM

## 2025-05-30 DIAGNOSIS — I48.91 ATRIAL FIBRILLATION, UNSPECIFIED TYPE: Primary | ICD-10-CM

## 2025-06-04 ENCOUNTER — TELEPHONE (OUTPATIENT)
Dept: CARDIOLOGY | Facility: HOSPITAL | Age: 55
End: 2025-06-04
Payer: COMMERCIAL

## 2025-06-23 NOTE — PROGRESS NOTES
Subjective   Patient ID:  Goldy Irizarry is a 54 y.o. male who presents for evaluation of Atrial Fibrillation      54 yoM here for AF management. He suffered a CVA leading to a working that disclosed a PFO. No arrhythmias on an event monitor. He underwent PFO closure 5/9/25 with subsequent placement of another monitor. On the second monitor he had episodes of AF and AFL based on event monitor. My interpretation is AFL.     JAYLON 4/4/25:  ·  Left Ventricle: The left ventricle is normal in size. Normal wall thickness. There is normal systolic function.  ·  Right Ventricle: The right ventricle is normal in size. Systolic function is normal.  ·  Left Atrium: There is an atrial septal aneurysm bulging to the left with a maximal excusion of 1.1 cm. A small patent foramen ovale is visualized indicated by saline contrast.  ·  Valves: Structurally normal valves.  ·  Aorta: There is no plaque within the thoracic aorta.     Monitor 5/9/25:  ·  Baseline rhythm was normal sinus rhythm with normal intervals and an initial heart rate of 81 bpm.  ·  There were 4 patient-triggered episodes with no reported symptoms. These episodes corresponded to periods of normal sinus rhythm and sinus tachycardia without ectopy.  ·  There were rare PACs.  ·  There were periods of atrial flutter with degeneration into paroxysmal atrial fibrillation.    PFO closure 5/9/25:     ·  The estimated blood loss was <50 mL.  ·  A successful PFO closure was performed.    My interpretation of the ECG is:  NSR nl NH, QRS, QTc    Past Medical History:  03/27/2025: Acute cerebrovascular accident (CVA) due to occlusion of   right middle cerebral artery  04/15/2025: Dyslipidemia  07/10/2024: History of colon polyps      Comment:  7-2024: Next in 3 years   - One 5 mm polyp in the                transverse colon, removed                        with a               cold snare. Resected and retrieved.                                       - One 2 mm polyp in the  transverse colon, removed                                       with a jumbo cold forceps.                Resected and retrieved.                        - One 5                mm polyp in the descending colon, removed                                       with   04/05/2025: PFO (patent foramen ovale)      Comment:  4-2025  PFO, and not a sinus of Valsalva ASD or                pulmonary AVM. Recommend proceeding w percutaneous                closure of the PFO.  My additional recommendations are as               follows:      Complete work up to r/o a hypercoagulable                state.    Refer to Hematology for the same reason either                e-consult or in-person.    Switch dual antiplatelet                therapy to anticoagulant. (I understand that this is the                most controversial re  01/10/2024: Vitamin D insufficiency    Past Surgical History:  5/9/2025: CLOSURE, PFO; N/A      Comment:  Procedure: Closure, PFO;  Surgeon: Garo Jean Baptiste MD;  Location: [facility] CATH LAB;  Service: Cardiology;                 Laterality: N/A;  7/10/2024: COLONOSCOPY; N/A      Comment:  Procedure: COLONOSCOPY;  Surgeon: Travis Arana MD;                Location: [facility] ENDO ([address] [place] [address]);  Service: Endoscopy;                 Laterality: N/A;  ref by / domonique                instrescheduled-resent prep/Suflave and mailed                instructions-KP7/2-precall complete-KPvt7/9-pt moved                from extra screening to Dr. Araan-Kpvt  4/4/2025: ECHOCARDIOGRAM,TRANSESOPHAGEAL; N/A      Comment:  Procedure: Transesophageal echo (JAYLON) intra-procedure                log documentation;  Surgeon: Provider, Dosc Diagnostic;                 Location: [facility] EP LAB;  Service: Cardiology;  Laterality:               N/A;  5/9/2025: ICE, PERFORMED      Comment:  Procedure: ICE, Performed;  Surgeon: Garo Jean Baptiste MD;  Location: [facility] CATH LAB;  Service: Cardiology;;    Social  History    Socioeconomic History      Marital status: Single      Number of children: 1    Occupational History      Occupation: commercial ramo    Tobacco Use      Smoking status: Never      Smokeless tobacco: Never    Substance and Sexual Activity      Alcohol use: No        Alcohol/week: 0.0 standard drinks of alcohol      Drug use: No      Sexual activity: Not Currently        Partners: Female    Social History Narrative      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      Single      One girl 27 (as of 2023).- In [place].  Sib-in law is Cardiology.            Pescatarian          Social Drivers of Health  Food Insecurity: No Food Insecurity (3/28/2025)      Received from [facility]      Hunger Vital Sign          Worried About Running Out of Food in the Last Year: Never true          Ran Out of Food in the Last Year: Never true  Transportation Needs: No Transportation Needs (3/28/2025)      Received from [facility]      PRAPARE - Transportation          Lack of Transportation (Medical): No          Lack of Transportation (Non-Medical): No  Housing Stability: Low Risk  (3/28/2025)      Received from [facility]      Housing Stability Vital Sign          Unable to Pay for Housing in the Last Year: No          Number of Times Moved in the Last Year: 0          Homeless in the Last Year: No    Review of patient's family history indicates:  Problem: Diabetes      Relation: Mother          Name:               Age of Onset: (Not Specified)  Problem: Hypertension      Relation: Mother          Name:               Age of Onset: (Not Specified)  Problem: No Known Problems      Relation: Father          Name:               Age of Onset: (Not Specified)  Problem: Colon cancer      Relation: Neg Hx          Name:               Age of Onset: (Not Specified)  Problem: Colon polyps      Relation: Neg Hx          Name:               Age of Onset: (Not Specified)      Current Outpatient Medications:  allopurinoL  (ZYLOPRIM) 100 MG tablet, Take 1 tablet (100 mg total) by mouth once daily., Disp: 90 tablet, Rfl: 3  aspirin 81 MG Chew, Take 1 tablet by mouth once daily., Disp: , Rfl:   atorvastatin (LIPITOR) 40 MG tablet, Take 1 tablet (40 mg total) by mouth once daily., Disp: 90 tablet, Rfl: 3  cholecalciferol, vitamin D3, 125 mcg (5,000 unit) Tab, Take 1 tablet (5,000 Units total) by mouth once daily., Disp: , Rfl:   clopidogreL (PLAVIX) 75 mg tablet, Take 1 tablet (75 mg total) by mouth once daily., Disp: 90 tablet, Rfl: 3  colchicine (COLCRYS) 0.6 mg tablet, Take 1 tablet (0.6 mg total) by mouth 2 (two) times daily as needed (gout)., Disp: 30 tablet, Rfl: 11    No current facility-administered medications for this visit.        Review of Systems   Constitutional: Negative.   HENT: Negative.     Eyes: Negative.    Cardiovascular:  Negative for chest pain, dyspnea on exertion, leg swelling, near-syncope, palpitations and syncope.   Respiratory: Negative.  Negative for shortness of breath.    Endocrine: Negative.    Hematologic/Lymphatic: Negative.    Skin: Negative.    Musculoskeletal: Negative.    Gastrointestinal: Negative.    Genitourinary: Negative.    Neurological: Negative.  Negative for dizziness and light-headedness.   Psychiatric/Behavioral: Negative.     Allergic/Immunologic: Negative.           Objective     Physical Exam  Vitals reviewed.   Constitutional:       General: He is not in acute distress.     Appearance: He is well-developed.   HENT:      Head: Normocephalic and atraumatic.   Eyes:      Pupils: Pupils are equal, round, and reactive to light.   Neck:      Thyroid: No thyromegaly.      Vascular: No JVD.   Cardiovascular:      Rate and Rhythm: Normal rate and regular rhythm.      Chest Wall: PMI is not displaced.      Heart sounds: Normal heart sounds, S1 normal and S2 normal. No murmur heard.     No friction rub. No gallop.   Pulmonary:      Effort: Pulmonary effort is normal. No respiratory distress.       Breath sounds: Normal breath sounds. No wheezing or rales.   Abdominal:      General: Bowel sounds are normal. There is no distension.      Palpations: Abdomen is soft.      Tenderness: There is no abdominal tenderness. There is no guarding or rebound.   Musculoskeletal:         General: No tenderness. Normal range of motion.      Cervical back: Normal range of motion.   Skin:     General: Skin is warm and dry.      Findings: No erythema or rash.   Neurological:      Mental Status: He is alert and oriented to person, place, and time.      Cranial Nerves: No cranial nerve deficit.   Psychiatric:         Behavior: Behavior normal.         Thought Content: Thought content normal.         Judgment: Judgment normal.            Assessment and Plan     1. Paroxysmal atrial fibrillation    2. H/O ischemic right MCA stroke    3. PFO (patent foramen ovale) s/p closure    4. Acute cerebrovascular accident (CVA) due to occlusion of right middle cerebral artery    5. Typical atrial flutter        Plan:  54 yoM with AFL after PFO closure 5/9/25. He wishes to see if his arrhythmias persist prior to committing to CTI ablation. 2w extended holter and make plans accordingly.

## 2025-06-24 ENCOUNTER — DOCUMENTATION ONLY (OUTPATIENT)
Dept: CARDIOLOGY | Facility: HOSPITAL | Age: 55
End: 2025-06-24
Payer: COMMERCIAL

## 2025-06-24 ENCOUNTER — CLINICAL SUPPORT (OUTPATIENT)
Dept: CARDIOLOGY | Facility: HOSPITAL | Age: 55
End: 2025-06-24
Attending: INTERNAL MEDICINE
Payer: COMMERCIAL

## 2025-06-24 ENCOUNTER — HOSPITAL ENCOUNTER (OUTPATIENT)
Dept: CARDIOLOGY | Facility: CLINIC | Age: 55
Discharge: HOME | End: 2025-06-24
Payer: COMMERCIAL

## 2025-06-24 ENCOUNTER — OFFICE VISIT (OUTPATIENT)
Dept: ELECTROPHYSIOLOGY | Facility: CLINIC | Age: 55
End: 2025-06-24
Payer: COMMERCIAL

## 2025-06-24 VITALS
DIASTOLIC BLOOD PRESSURE: 90 MMHG | SYSTOLIC BLOOD PRESSURE: 145 MMHG | BODY MASS INDEX: 25.37 KG/M2 | WEIGHT: 208.31 LBS | HEART RATE: 68 BPM | HEIGHT: 76 IN

## 2025-06-24 DIAGNOSIS — Z86.73 H/O ISCHEMIC RIGHT MCA STROKE: ICD-10-CM

## 2025-06-24 DIAGNOSIS — I63.511 ACUTE CEREBROVASCULAR ACCIDENT (CVA) DUE TO OCCLUSION OF RIGHT MIDDLE CEREBRAL ARTERY: ICD-10-CM

## 2025-06-24 DIAGNOSIS — Q21.12 PFO (PATENT FORAMEN OVALE): ICD-10-CM

## 2025-06-24 DIAGNOSIS — I48.0 PAROXYSMAL ATRIAL FIBRILLATION: Primary | ICD-10-CM

## 2025-06-24 DIAGNOSIS — I48.3 TYPICAL ATRIAL FLUTTER: ICD-10-CM

## 2025-06-24 DIAGNOSIS — I48.91 ATRIAL FIBRILLATION, UNSPECIFIED TYPE: ICD-10-CM

## 2025-06-24 LAB
OHS QRS DURATION: 90 MS
OHS QTC CALCULATION: 416 MS

## 2025-06-24 PROCEDURE — 99999 PR PBB SHADOW E&M-EST. PATIENT-LVL III: CPT | Mod: PBBFAC,,, | Performed by: INTERNAL MEDICINE

## 2025-06-24 PROCEDURE — 93010 ELECTROCARDIOGRAM REPORT: CPT | Mod: S$GLB,,, | Performed by: INTERNAL MEDICINE

## 2025-06-24 PROCEDURE — 93246 EXT ECG>7D<15D RECORDING: CPT

## 2025-06-24 PROCEDURE — 93000 ELECTROCARDIOGRAM COMPLETE: CPT | Mod: S$GLB,,, | Performed by: STUDENT IN AN ORGANIZED HEALTH CARE EDUCATION/TRAINING PROGRAM

## 2025-06-24 NOTE — PROGRESS NOTES
I reached out to [employer] to verify insurance for the above patient 14 day monitor. I spoke with   today and was told the estimated out of pocket cost would be covered under the patient's responsibility for the service provided by [employer]. The patient confirmed understanding and agreed to continue with the monitor placement. The call reference number is as stated:[id number]    Tracking your UPS package:  [id number]

## 2025-08-07 ENCOUNTER — TELEPHONE (OUTPATIENT)
Dept: ELECTROPHYSIOLOGY | Facility: CLINIC | Age: 55
End: 2025-08-07
Payer: COMMERCIAL

## 2025-08-07 NOTE — TELEPHONE ENCOUNTER
Called patient with results of monitor. Patient wanted to schedule his follow up appointment that is due in December with Dr. Blunt. Patient wanted to be seen a little earlier. Patient scheduled for 10/21/25. Patient verbalized understanding of results and agrees with appointment date and time.

## 2025-08-07 NOTE — TELEPHONE ENCOUNTER
----- Message from Pawan Blunt MD sent at 8/7/2025 10:01 AM CDT -----  We were seeing of he had any AFL to determine if we proceed with ablation. No AFL so no plans for ablation. Can you let him know? thanks  ----- Message -----  From: Pawan Blunt MD  Sent: 7/24/2025   1:42 PM CDT  To: Pawan Blunt MD

## (undated) DEVICE — R CATH ACUSON ACUNAV 8FR

## (undated) DEVICE — GUIDEWIRE AMPLATZ .035X260

## (undated) DEVICE — GUIDE WIRE WHOLEY EXCHANGE 300

## (undated) DEVICE — STOPCOCK 3-WAY

## (undated) DEVICE — OMNIPAQUE CONTRAST 350MG/100ML

## (undated) DEVICE — TRAY CATH LAB OMC

## (undated) DEVICE — DECANTER FLUID TRNSF WHITE 9IN

## (undated) DEVICE — KIT MICROINTRO 4F .018X40X7CM

## (undated) DEVICE — SYR 50CC LL

## (undated) DEVICE — SPIKE SHORT LG BORE 1-WAY 2IN

## (undated) DEVICE — TRANSDUCER ADULT DISP

## (undated) DEVICE — SHEATH BRITE TIP 9F 35CM

## (undated) DEVICE — KIT CUSTOM MANIFOLD

## (undated) DEVICE — PAD DEFIB CADENCE ADULT R2

## (undated) DEVICE — GUIDEWIRE ROSEN VAS JTIP 180CM

## (undated) DEVICE — INTRO FAST-CATH SL1 8.5FR 63CM

## (undated) DEVICE — SHEATH PINNACLE INTRO 11FR

## (undated) DEVICE — TOWEL OR DISP STRL BLUE 4/PK

## (undated) DEVICE — SNAP CAP 18 DOME COVERS

## (undated) DEVICE — COVER PRB TRNSDUC 7.6X183CM

## (undated) DEVICE — GUIDEWIRE EMERALD 150CM PTFE

## (undated) DEVICE — CATH DXTERITY MPASHA 110CM 6FR

## (undated) DEVICE — BLLN SIZING AGA 24MM

## (undated) DEVICE — INTRODUCER SHEATH PERFORMER 8F